# Patient Record
Sex: FEMALE | Race: WHITE | Employment: UNEMPLOYED | URBAN - METROPOLITAN AREA
[De-identification: names, ages, dates, MRNs, and addresses within clinical notes are randomized per-mention and may not be internally consistent; named-entity substitution may affect disease eponyms.]

---

## 2018-08-22 ENCOUNTER — OFFICE VISIT (OUTPATIENT)
Dept: PULMONOLOGY | Age: 63
End: 2018-08-22

## 2018-08-22 VITALS
WEIGHT: 151 LBS | SYSTOLIC BLOOD PRESSURE: 118 MMHG | OXYGEN SATURATION: 99 % | TEMPERATURE: 98.5 F | DIASTOLIC BLOOD PRESSURE: 68 MMHG | BODY MASS INDEX: 27.79 KG/M2 | HEART RATE: 72 BPM | RESPIRATION RATE: 18 BRPM | HEIGHT: 62 IN

## 2018-08-22 DIAGNOSIS — J47.9 BRONCHIECTASIS WITHOUT COMPLICATION (HCC): ICD-10-CM

## 2018-08-22 DIAGNOSIS — R94.2 DIFFUSION CAPACITY OF LUNG (DL), DECREASED: ICD-10-CM

## 2018-08-22 DIAGNOSIS — M32.19 OTHER SYSTEMIC LUPUS ERYTHEMATOSUS WITH OTHER ORGAN INVOLVEMENT (HCC): ICD-10-CM

## 2018-08-22 DIAGNOSIS — D83.9 CVID (COMMON VARIABLE IMMUNODEFICIENCY) (HCC): ICD-10-CM

## 2018-08-22 DIAGNOSIS — J45.990 EXERTIONAL ASTHMA: ICD-10-CM

## 2018-08-22 DIAGNOSIS — K21.9 GASTROESOPHAGEAL REFLUX DISEASE, ESOPHAGITIS PRESENCE NOT SPECIFIED: ICD-10-CM

## 2018-08-22 DIAGNOSIS — R91.1 PULMONARY NODULE: ICD-10-CM

## 2018-08-22 DIAGNOSIS — R05.3 CHRONIC COUGH: ICD-10-CM

## 2018-08-22 DIAGNOSIS — J32.0 CHRONIC MAXILLARY SINUSITIS: ICD-10-CM

## 2018-08-22 DIAGNOSIS — D80.1 HYPOGAMMAGLOBULINEMIA (HCC): ICD-10-CM

## 2018-08-22 DIAGNOSIS — R06.02 SHORTNESS OF BREATH: Primary | ICD-10-CM

## 2018-08-22 DIAGNOSIS — J45.30 MILD PERSISTENT ASTHMA WITHOUT COMPLICATION: ICD-10-CM

## 2018-08-22 DIAGNOSIS — J98.11 ATELECTASIS: ICD-10-CM

## 2018-08-22 DIAGNOSIS — R06.09 DYSPNEA ON EXERTION: ICD-10-CM

## 2018-08-22 RX ORDER — SERTRALINE HYDROCHLORIDE 100 MG/1
200 TABLET, FILM COATED ORAL DAILY
COMMUNITY

## 2018-08-22 RX ORDER — CYCLOBENZAPRINE HCL 10 MG
10 TABLET ORAL 2 TIMES DAILY
COMMUNITY

## 2018-08-22 RX ORDER — FLUTICASONE FUROATE AND VILANTEROL 100; 25 UG/1; UG/1
1 POWDER RESPIRATORY (INHALATION) DAILY
Qty: 1 INHALER | Refills: 0 | Status: SHIPPED | COMMUNITY
Start: 2018-08-22 | End: 2019-05-03 | Stop reason: SDUPTHER

## 2018-08-22 RX ORDER — METFORMIN HYDROCHLORIDE 500 MG/1
1 TABLET, FILM COATED, EXTENDED RELEASE ORAL 2 TIMES DAILY
COMMUNITY

## 2018-08-22 RX ORDER — MELOXICAM 7.5 MG/1
7.5 TABLET ORAL DAILY
COMMUNITY

## 2018-08-22 RX ORDER — FLUTICASONE FUROATE AND VILANTEROL 100; 25 UG/1; UG/1
1 POWDER RESPIRATORY (INHALATION) DAILY
Qty: 1 INHALER | Refills: 11 | Status: SHIPPED | OUTPATIENT
Start: 2018-08-22 | End: 2019-05-03

## 2018-08-22 RX ORDER — CLOPIDOGREL BISULFATE 75 MG/1
75 TABLET ORAL DAILY
COMMUNITY

## 2018-08-22 RX ORDER — SERTRALINE HYDROCHLORIDE 50 MG/1
50 TABLET, FILM COATED ORAL DAILY
COMMUNITY
Start: 2019-10-10 | End: 2019-10-10

## 2018-08-22 RX ORDER — LANSOPRAZOLE 30 MG/1
30 CAPSULE, DELAYED RELEASE ORAL
COMMUNITY

## 2018-08-22 NOTE — PROGRESS NOTES
[address] [facility] [place] [facility]  Pulmonary, Critical Care, and Sleep Medicine    Pulmonary Office Initial Consultation  Name: Lindsay Dean 60 y.o. female  MRN: [id number]  : 1955  Service Date: 18    Referring Provider: No referring provider defined for this encounter. Chief Complaint:   Chief Complaint   Patient presents with    Bronchiectasis     referred by Dr. May Guest of Breath       History of Present Illness:  Lindsay Dean is a 60 y.o. female, who presents to Pulmonary clinic referred for management of bronchiectasis and shortness of breath. Pt has been following with Dr. Charlotte Mcintyre and wants to find a provider closer to [place]. Pt moved to [place] from [facility] about 3 years ago. 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 She was following with ID and was then referred to Pulmonary. Pt reports she gets 40mg every 2 weeks IVIG infusions -- she gives this to herself at home. Pt reports issues with SOB on exertion. She describes this as \"i feel like I try to take in a deep breath and I'm not opening\". Pt reports walking about 0.6 miles on a treadmill and that's when she feels this symptoms. Per reports, this has been going on since the end of last year. Associated with dizziness, chest tightness  Alleviated by rest, self-limited, no other aggravating or precipitating factors  No seasonal variation  Pt uses PRN albuterol morning and night as well as before walking on the treadmill. Pt reports some improvement to dyspnea. Pt reports she was placed on Singulair in April by ENT and noted that she felt a bit better. Pt able to get around grocery store and do ADLs. Pt has chronic cough, recently worsened, but was alleviated by stopping lipitor.   Cough occurs intermittently, nonproductive, no other precipitating, alleviating, or aggravating factors    Pt has attempted hypertonic saline about 3-4 years ago, stopped after one month. Also has a flutter device but does not use it.  + GERD -- pt on lansoprazole - taking BID -- still has persistent sx. Last EGD was 5 years ago -- she had multiple polyps. Pt denies any ER visits or hospitalizations for breathing issues  Pt reports episodes of recurrent bronchitis about 20-30 years ago. Pt reports that since restarting IVIG, she has been doing well and not had any episodes since then  Pt reports recurrent episodes of sinusits -- she reports she saw multiple ENT physicians (now follows with Jaxon Rubio with [facility]) and had surgery. She had chronic colonization with MRSA requiring. No chest pain. Pt had a cardiac workup - follows with Dr. Cachorro Small, who did a stress echo and cleared the patient  With regards to SLE -- pt was plaquenil. She had to this about 3 years ago due to vision problems with the medicatoin. Pt has been on imuran in the past, but she did not tolerate it. Pt denies N/V/D, chest pain/angina, night sweats, hemoptysis, sputum, abdominal pain, LE swelling. 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  Only secondhand smoke as a child      Past Medical Hx: I have personally reviewed medical hx  Past Medical History:   Diagnosis Date    Anxiety     Aortic insufficiency     CVA [facility]cerebral vascular accident) ([facility] [address]     3 strokes and multiple transient ischemic attacks    Depression     anxiety    DOS (diffuse esophageal spasm)     Esophageal reflux     Fibromyalgia     Ganglion     Heart disease     Heart murmur     Hx MRSA infection     Hypercholesterolemia     Hypogammaglobulinemia (HCC)     IBS (irritable bowel syndrome)     MS (multiple sclerosis) (HCC)     Nausea and vomiting     SLE (systemic lupus erythematosus) ([facility] [address] [facility]     Staphylococcus infection of nose 2009    Thyroid disease        Past Surgical Hx: I have personally reviewed surgical hx  Past Surgical History:   Procedure Laterality Date    HX  SECTION      HX CHOLECYSTECTOMY      HX HYSTERECTOMY      HX RHINOPLASTY      HX TONSILLECTOMY      HX TUBAL LIGATION      HX VASCULAR ACCESS Right 2011       Family Hx: I have personally reviewed the family hx. Family History   Problem Relation Age of Onset    Heart Disease Mother     Diabetes Mother     Diabetes Father     Hypertension Father     Diabetes Maternal Aunt     Heart Disease Maternal Grandmother     Diabetes Maternal Grandmother     Stroke Maternal Grandmother     Diabetes Maternal Grandfather     Heart Disease Maternal Grandfather     Stroke Maternal Grandfather        Social Hx: I have personally reviewed the social hx. Social History     Social History    Marital status:      Spouse name: N/A    Number of children: N/A    Years of education: N/A     Occupational History    Not on file. Social History Main Topics    Smoking status: Never Smoker    Smokeless tobacco: Never Used    Alcohol use No    Drug use: No    Sexual activity: Not on file     Other Topics Concern    Not on file     Social History Narrative     Occupational Hx: No occupational exposure to coal, silica, or asbestos      Allergies: I have reviewed the allergy hx  Allergies   Allergen Reactions    Ceftin [Cefuroxime Axetil] Anaphylaxis and Angioedema    Codeine Hives    Dilaudid [Hydromorphone] Anaphylaxis and Angioedema    Hydrocodone Hives    Meperidine Hives    Oxycodone Hives    Sulfa (Sulfonamide Antibiotics) Hives    Adhesive Other (comments)    Cephalosporins Rash and Itching    Linezolid Nausea and Vomiting       Medications:  I have reviewed the patient's medications  Prior to Admission medications    Medication Sig Start Date End Date Taking? Authorizing Provider   meloxicam (MOBIC) 7.5 mg tablet Take  by mouth daily.    Yes Historical Provider cyclobenzaprine (FLEXERIL) 10 mg tablet Take 10 mg by mouth two (2) times a day. Yes Historical Provider   sertraline (ZOLOFT) 100 mg tablet Take 100 mg by mouth daily. Yes Historical Provider   sertraline (ZOLOFT) 50 mg tablet Take 50 mg by mouth daily. Yes Historical Provider   clopidogrel (PLAVIX) 75 mg tab Take  by mouth. Yes Historical Provider   metFORMIN (GLUMETZA ER) 500 mg TG24 24 hour tablet Take  by mouth two (2) times a day. Yes Historical Provider   lansoprazole (PREVACID) 30 mg capsule Take 30 mg by mouth two (2) times a day. Yes Historical Provider   diazepam (VALIUM) 2 mg tablet Take 1 Tab by mouth every six (6) hours as needed for Sleep. Max Daily Amount: 8 mg. 4/2/16  Yes Orly Fitzgerald MD   traMADol (ULTRAM) 50 mg tablet Take 2 Tabs by mouth every six (6) hours as needed. Max Daily Amount: 400 mg. 4/2/16  Yes Orly Fitzgerald MD   ergocalciferol (ERGOCALCIFEROL) 50,000 unit capsule Take 50,000 Units by mouth every seven (7) days. Indications: PREVENTION OF VITAMIN D DEFICIENCY   Yes Historical Provider   promethazine (PHENERGAN) 25 mg tablet Take 25 mg by mouth every six (6) hours as needed for Nausea. Indications: nausea   Yes Historical Provider   albuterol (PROVENTIL HFA, VENTOLIN HFA, PROAIR HFA) 90 mcg/actuation inhaler Take  by inhalation every four (4) hours as needed for Wheezing or Shortness of Breath. Yes Historical Provider   co-enzyme Q-10 (CO Q-10) 100 mg capsule Take 100 mg by mouth daily. Yes Historical Provider   immune globulin 10% (GAMMAGARD) 10 % infusion 30 g by IntraVENous route two (2) times a week. Indications: [address]   Yes Historical Provider   guaiFENesin (MUCINEX) 1,200 mg Ta12 ER tablet Take 1,200 mg by mouth as needed for Congestion. Yes Historical Provider   diazepam (VALIUM) 2 mg tablet Take 2 mg by mouth every six (6) hours as needed for Sleep. Yes Historical Provider   Biotin 2,500 mcg cap Take 1,000 mg by mouth daily.    Yes Historical Provider   acetaminophen (TYLENOL) 500 mg tablet Take 1,000 mg by mouth every six (6) hours as needed for Pain. Yes Historical Provider   vancomycin 1,000 mg 1,000 mg IVPB 1 g by IntraNASal route as needed (mixed with Amphotericin  for nasal wash). Yes Historical Provider   traMADol (ULTRAM) 50 mg tablet Take 50 mg by mouth every six (6) hours as needed. Yes Historical Provider   propranolol LA (INDERAL LA) 120 mg SR capsule Take 120 mg by mouth daily. Yes Historical Provider   multivitamin (ONE A DAY) tablet Take 1 Tab by mouth daily. Yes Historical Provider   gabapentin (NEURONTIN) 300 mg capsule Take 300 mg by mouth three (3) times daily. Yes Historical Provider   modafinil (PROVIGIL) 200 mg tablet Take 200 mg by mouth daily. Yes Historical Provider   aspirin 81 mg tablet Take 81 mg by mouth. Yes Historical Provider   simvastatin (ZOCOR) 40 mg tablet Take 20 mg by mouth nightly. Historical Provider   vitamin c-vitamin e cap Take 1 Tab by mouth as needed. Historical Provider   DULoxetine (CYMBALTA) 30 mg capsule Take 30 mg by mouth daily. Historical Provider   zolpidem (AMBIEN) 10 mg tablet Take  by mouth nightly as needed. Historical Provider   celecoxib (CELEBREX) 200 mg capsule Take  by mouth two (2) times a day. Historical Provider       Immunizations:  I have reviewed the patient's immunizations    There is no immunization history on file for this patient. Review of Systems:  A complete review of systems was performed as stated in the HPI, all others are negative.       Objective:    Physical Exam:  /68 (BP 1 Location: Left arm, BP Patient Position: Sitting)  Pulse 72  Temp 98.5 °F (36.9 °C) (Oral)   Resp 18  Ht 5' 2\" (1.575 m)  Wt 68.5 kg (151 lb)  SpO2 99%  BMI 27.62 kg/m2  Vitals were personally reviewed  Gen: no acute distress, pleasant and cooperative, sitting up in chair, able to climb to exam table w/o difficulty  HEENT: normocephalic/atraumatic, PERRLA, EOMI, no scleral icterus, nasal turbinates have no erythema, no nasal polyps, no oral lesions, good dentition, Mallampati III  Neck: supple, trachea midline, no JVD, no cervical and supraclavicular adenopathy  Chest: no lesions, with even rise and fall, no pectus excavatum or flail chest  CVS: regular rate rhythm, S1/S2, no murmurs/rubs/gallops  Lungs: good air entry B/L, no wheezes/rales/rhonchi  Back: no kyphosis or scoliosis  Abdomen: soft, nontender, bowel sounds present, no hepatosplenomegaly  Ext: no pitting edema B/L, no peripheral cyanosis or clubbing  Neuro: grossly normal, AAOx3, normal strength and coordination grossly, no focal deficits  Skin: no rashes, erythema, lesions  Psych: normal memory, thought content, and processing    Labs: I have reviewed the patient's available labs  Lab Results   Component Value Date/Time    WBC 7.6 03/25/2016 12:54 PM    HGB 13.7 03/25/2016 12:54 PM    HCT 42.9 03/25/2016 12:54 PM    PLATELET 277 03/84/4626 12:54 PM    MCV 89.6 03/25/2016 12:54 PM     Lab Results   Component Value Date/Time    Sodium 136 03/25/2016 12:54 PM    Potassium 4.3 03/25/2016 12:54 PM    Chloride 102 03/25/2016 12:54 PM    CO2 29 03/25/2016 12:54 PM    Glucose 103 03/25/2016 12:54 PM    BUN 13 03/25/2016 12:54 PM    Creatinine 1.0 03/25/2016 12:54 PM    GFR est AA >60.0 03/25/2016 12:54 PM    GFR est non-AA 60 03/25/2016 12:54 PM    Calcium 9.0 03/25/2016 12:54 PM    Bilirubin, total 0.4 03/25/2016 12:54 PM    AST (SGOT) 48 (H) 03/25/2016 12:54 PM    Alk. phosphatase 151 (H) 03/25/2016 12:54 PM    Protein, total 8.3 (H) 03/25/2016 12:54 PM    Albumin 3.3 (L) 03/25/2016 12:54 PM    ALT (SGPT) 45 03/25/2016 12:54 PM       Outside records reviewed as follows -- Records from her Pulmonologist - Dr. Rachelle Schumacher with [facility] -- seen on 9/13/16 - noted to have chronic cough - attempted to try Spiriva. Noted that pt had GERD but could not afford Dexilant.   Pt on IVIG for CVID.  Given Tessalon pearls. Pt seen on 4/19/18 by the VARINDER Gavin -- pt reported SOB with exertion on treadmill -- they did doppler to rule out VTE  PFTs from that day show normal ratio - normal FEV1, normal FVC, normal TLC, and decreased DLCO to 56% uncorrected  Pt then saw Dr. Charlotte Mcintyre on 5/22/18 -- pt had continued TAYLOR and cough. He wanted to rule out pHTN with TTE. Pt emotional during this visit per records. D-dimer was negative  Pt had 6MWT, which showed SpO2 went from 98% to 93%, no SpO2 <88%    Imaging:  I have personally reviewed patient's imaging -- no images available. Only CT chest report from 6/18/12 which shows mild diffuse bronchiectasis, dependent atelctasis B/L, some mild bilateral bronchiectasis, no GGO. Shows an RUL 4mm nodule. PFTs:  I have reviewed the patient's PFTs from 4/5/18 done at prior pulmonogist's office -- edwin and lung volumes are normal, no BD response. Diffusion capacity is moderately reduced to 56%    TTE:  I have reviewed the patient's TTE results  No results found for this or any previous visit. Assessment and Plan:  60 y.o. female with:    Impression:  1. Shortness of breath/dyspnea on exertion:  Etiology unclear but appears to be multifactorial.  Pt has underlying bronchiectasis from CVID, which appears to be well controlled with IVIG supplementation. Workup for these symptoms by her prior provider included ruling out VTE and pHTN, which has been unremarkable. Although her symptoms are not classic, she appears to have sx of an exercise induced asthma given some improvement with singulair and PRN albuterol. There also appears to be a component of mild deconditioning as well. Cardiac workup is negative  2. Non-CF bronchiectasis:  Secondary to CVID. 3. CVID with hypogammaglobulinemia:    Pt on IVIG supplementation  4. Hx of SLE:  Pt not on DMARD therapy, was on plaquenil prior  5. Atelectasis:  Seen on prior CT  6. Lung nodule:  Seen in RUL, 4mm  7. Chronic cough - suspect some component of exertional asthma vs mild deconditioning  8. Reduced diffusion capacity:  Moderate on last PFTs - unclear etiology, no significant desat with 6MWT  9. GERD:  Poorly controlled on BID PPI    Plan:  -Obtain CT chest high res protocol  -Spent time discussing patient's respiratory issues as pt has had a long complicated course. We went over her bronchiectasis, etiology, pathophysiology, and treatments which include treating underlying cause (CVID - pt on IVIG) and airway clearance. Since patient has not had any episodes of bronchitis, we will hold off on airway clearance, but reported to patient that she may develop bronchitis in the future and she will need to start airway clearance at that time with hypertonic saline/alb nebs and flutter device/acapella.  -Pt agreeable to empiric treatment of asthma -- start Breo 100/25mcg 1 puff once daily. Counseled pt to rinse mouth thoroughly after each use. Sample given in clinic  -Continue Albuterol HFA 1-2puffs q4-6h PRN. Counseled patient that this is their rescue inhaler. Also counseled patient regarding premedication 15-30m prior to exercise or exposure to very cold air  -Counseled patient on proper inhaler technique  -Counseled patient to avoid potential triggers of asthma.  -Continue Singulair 10mg daily  -Immunizations reviewed   There is no immunization history on file for this patient.  -Counseled on reflux lifestyle precautions and to continue BID PPI - take 30min before meals  -PFTs at next visit  -Refer patient to GI for further workup and management of GERD -- pH Bravo testing  -Continue IVIG infusions q2 weeks by Dr. Nick Cristina  -Management of SLE per Dr. Marissa Hensley      RTC: Follow-up Disposition:  Return in about 4 months (around 12/22/2018).       Orders Placed This Encounter    AMB POC PFT COMPLETE W/BRONCHODILATOR    AMB POC PFT COMPLETE W/O BRONCHODILATOR    GAS DILUT/WASHOUT LUNG VOL W/WO DISTRIB VENT&VOL    DIFFUSING CAPACITY    CT CHEST W WO CONT    REFERRAL TO GASTROENTEROLOGY    fluticasone-vilanterol (BREO ELLIPTA) 100-25 mcg/dose inhaler    fluticasone-vilanterol (BREO ELLIPTA) 100-25 mcg/dose inhaler         James Gross MD/MPH     Pulmonary, Critical Care Medicine  [facility]

## 2018-08-22 NOTE — LETTER
8/22/2018 12:53 PM 
 
Patient:  Tutu Savage YOB: 1955 Date of Visit: 8/22/2018 Dear Alba Vincent MD 
[address] [facility] [address] VIA Facsimile: [phone] Nicho Vera MD 
[address] [place] [address] [facility] [address] VIA Facsimile: [phone] Sun Guardado MD 
[address] [place] [address] [facility] [address] VIA Facsimile: [phone] Raad Bergeron MD 
[facility] [address] 
[address] [facility] [address] VIA Facsimile: [phone] Mario Garrido MD 
[address] [facility] [facility] [address] VIA In Basket 
 : Thank you for referring Ms. Tutu Savage to me for evaluation/treatment. Below are the relevant portions of my assessment and plan of care. If you have questions, please do not hesitate to call me. I look forward to following Ms. Colt Robb along with you. Sincerely, Shahrzad Forrester MD/MPH Pulmonary, Critical Care Medicine [facility]

## 2018-08-22 NOTE — PROGRESS NOTES
Chief Complaint   Patient presents with    Bronchiectasis     referred by Dr. Judy Quinonez of Breath     1. Have you been to the ER, urgent care clinic since your last visit? Hospitalized since your last visit? No    2. Have you seen or consulted any other health care providers outside of the [address] [place] [address] since your last visit? Include any pap smears or colon screening.  Yes Where: Dr. Isabelle Moore, PCP, Dr. Norma Cook, [address] [place] [address]

## 2018-08-22 NOTE — MR AVS SNAPSHOT
[address] [facility], [address] [address] 
[phone] Patient: Alicia Pinon MRN: [id number] Visit Information Date & Time Provider Department Dept. Phone Encounter #  
 8/22/2018  9:00 AM Gunner Diaz MD [facility] Laura Myles [id number] Follow-up Instructions Return in about 4 months (around 12/22/2018). Upcoming Health Maintenance Date Due Hepatitis C Screening 1955 DTaP/Tdap/Td series (1 - Tdap) 9/24/1975 BREAST CANCER SCRN MAMMOGRAM 9/24/2004 FOBT Q 1 YEAR AGE 50-75 9/24/2004 ZOSTER VACCINE AGE 60> 7/24/2014 PAP AKA CERVICAL CYTOLOGY 4/18/2015 MEDICARE YEARLY EXAM 3/20/2018 Influenza Age 5 to Adult 8/1/2018 Allergies as of 8/22/2018  Review Complete On: 8/22/2018 By: Carisa Pink LPN Severity Noted Reaction Type Reactions Ceftin [Cefuroxime Axetil] High 03/22/2012    Anaphylaxis, Angioedema Codeine High 03/22/2012    Hives Dilaudid [Hydromorphone] High 03/22/2012    Anaphylaxis, Angioedema Hydrocodone High 03/22/2012    Hives Meperidine High 03/22/2012    Hives Oxycodone High 03/22/2012    Hives Sulfa (Sulfonamide Antibiotics) High 03/22/2012    Hives Adhesive  03/22/2012    Other (comments) Cephalosporins  03/22/2012    Rash, Itching Linezolid  03/22/2012    Nausea and Vomiting Current Immunizations  Never Reviewed No immunizations on file. Not reviewed this visit You Were Diagnosed With   
  
 Codes Comments Shortness of breath    -  Primary ICD-10-CM: R06.02 
ICD-9-CM: 786.05 Dyspnea on exertion     ICD-10-CM: R06.09 
ICD-9-CM: 786.09 Bronchiectasis without complication ([facility]     IGOR: J47.9 ICD-9-CM: 494.0 CVID (common variable immunodeficiency) ([facility].)     ICD-10-CM: D83.9 ICD-9-CM: 279.06 Hypogammaglobulinemia ([address]     ICD-10-CM: D80.1 ICD-9-CM: 279.00 Other systemic lupus erythematosus with other organ involvement ([facility]     ICD-10-CM: M32.19 ICD-9-CM: 710.0 Mild persistent asthma without complication     JIR-96-: J45.30 ICD-9-CM: 493.90 Atelectasis     ICD-10-CM: J98.11 ICD-9-CM: 518.0 Vitals BP Pulse Temp Resp Height(growth percentile) Weight(growth percentile)  
 118/68 (BP 1 Location: Left arm, BP Patient Position: Sitting) 72 98.5 °F (36.9 °C) (Oral) 18 5' 2\" (1.575 m) 151 lb (68.5 kg) SpO2 BMI OB Status Smoking Status 99% 27.62 kg/m2 Hysterectomy Never Smoker Vitals History BMI and BSA Data Body Mass Index Body Surface Area  
 27.62 kg/m 2 1.73 m 2 Preferred Pharmacy Pharmacy Name Phone [id number]- [place], [address] [facility] [phone] Your Updated Medication List  
  
   
This list is accurate as of 8/22/18 10:13 AM.  Always use your most recent med list.  
  
  
  
  
 acetaminophen 500 mg tablet Commonly known as:  TYLENOL Take 1,000 mg by mouth every six (6) hours as needed for Pain. albuterol 90 mcg/actuation inhaler Commonly known as:  PROVENTIL HFA, VENTOLIN HFA, PROAIR HFA Take  by inhalation every four (4) hours as needed for Wheezing or Shortness of Breath. AMBIEN 10 mg tablet Generic drug:  zolpidem Take  by mouth nightly as needed. aspirin 81 mg tablet Take 81 mg by mouth. Biotin 2,500 mcg Cap Take 1,000 mg by mouth daily. CeleBREX 200 mg capsule Generic drug:  celecoxib Take  by mouth two (2) times a day. co-enzyme Q-10 100 mg capsule Commonly known as:  CO Q-10 Take 100 mg by mouth daily. cyclobenzaprine 10 mg tablet Commonly known as:  FLEXERIL Take 10 mg by mouth two (2) times a day. CYMBALTA 30 mg capsule Generic drug:  DULoxetine Take 30 mg by mouth daily. * diazePAM 2 mg tablet Commonly known as:  VALIUM  
 Take 2 mg by mouth every six (6) hours as needed for Sleep. * diazePAM 2 mg tablet Commonly known as:  VALIUM Take 1 Tab by mouth every six (6) hours as needed for Sleep. Max Daily Amount: 8 mg. ergocalciferol 50,000 unit capsule Commonly known as:  ERGOCALCIFEROL Take 50,000 Units by mouth every seven (7) days. Indications: PREVENTION OF VITAMIN D DEFICIENCY * fluticasone-vilanterol 100-25 mcg/dose inhaler Commonly known as:  BREO ELLIPTA Take 1 Puff by inhalation daily. * fluticasone-vilanterol 100-25 mcg/dose inhaler Commonly known as:  BREO ELLIPTA Take 1 Puff by inhalation daily. guaiFENesin 1,200 mg Ta12 ER tablet Commonly known as:  PrestaShop Take 1,200 mg by mouth as needed for Congestion. immune globulin 10% 10 % infusion Commonly known as:  GAMMAGARD  
30 g by IntraVENous route two (2) times a week. Indications: [address] INDERAL  mg SR capsule Generic drug:  propranolol LA Take 120 mg by mouth daily. lansoprazole 30 mg capsule Commonly known as:  PREVACID Take 30 mg by mouth two (2) times a day. meloxicam 7.5 mg tablet Commonly known as:  MOBIC Take  by mouth daily. metFORMIN 500 mg Tg24 24 hour tablet Commonly known asSusana Power ER Take  by mouth two (2) times a day. multivitamin tablet Commonly known as:  ONE A DAY Take 1 Tab by mouth daily. NEURONTIN 300 mg capsule Generic drug:  gabapentin Take 300 mg by mouth three (3) times daily. PLAVIX 75 mg Tab Generic drug:  clopidogrel Take  by mouth.  
  
 promethazine 25 mg tablet Commonly known as:  PHENERGAN Take 25 mg by mouth every six (6) hours as needed for Nausea. Indications: nausea PROVIGIL 200 mg tablet Generic drug:  modafinil Take 200 mg by mouth daily. simvastatin 40 mg tablet Commonly known as:  ZOCOR Take 20 mg by mouth nightly. * traMADol 50 mg tablet Commonly known as:  Awanda Dilip  
 Take 2 Tabs by mouth every six (6) hours as needed. Max Daily Amount: 400 mg.  
  
 * ULTRAM 50 mg tablet Generic drug:  traMADol Take 50 mg by mouth every six (6) hours as needed. vancomycin 1,000 mg 1,000 mg IVPB  
1 g by IntraNASal route as needed (mixed with Amphotericin  for nasal wash). vitamin c-vitamin e Cap Take 1 Tab by mouth as needed. * ZOLOFT 100 mg tablet Generic drug:  sertraline Take 100 mg by mouth daily. * ZOLOFT 50 mg tablet Generic drug:  sertraline Take 50 mg by mouth daily. * Notice: This list has 8 medication(s) that are the same as other medications prescribed for you. Read the directions carefully, and ask your doctor or other care provider to review them with you. Prescriptions Sent to Pharmacy Refills  
 fluticasone-vilanterol (BREO ELLIPTA) 100-25 mcg/dose inhaler 11 Sig: Take 1 Puff by inhalation daily. Class: Normal  
 Pharmacy: [address] #: [phone] Route: Inhalation We Performed the Following REFERRAL TO GASTROENTEROLOGY [PAE43 Custom] Comments:  
 Please evaluate patient for chronic GERD - consider pH impedance testing. Follow-up Instructions Return in about 4 months (around 12/22/2018). To-Do List   
 08/22/2018 Imaging:  CT CHEST W WO CONT   
  
 08/23/2018 Procedures: AMB POC PFT COMPLETE W/BRONCHODILATOR   
  
 08/23/2018 Procedures: AMB POC PFT COMPLETE W/O BRONCHODILATOR   
  
 08/23/2018 Procedures:  DIFFUSING CAPACITY   
  
 08/23/2018 Procedures:  GAS DILUT/WASHOUT LUNG VOL W/WO DISTRIB VENT&VOL Referral Information Referral ID Referred By [facility]  
  
 [id number]   
   [address] [place] [address] Drive [address] [place], [address] Phone: [phone] Fax: [phone] Visits Status Start Date End Date 1 New Request 8/22/18 8/22/19 If your referral has a status of pending review or denied, additional information will be sent to support the outcome of this decision. Introducing [facility]! [facility] introduces Opternative patient portal. Now you can access parts of your medical record, email your doctor's office, and request medication refills online. 1. In your internet browser, go to [email or web address] 2. Click on the First Time User? Click Here link in the Sign In box. You will see the New Member Sign Up page. 3. Enter your Opternative Access Code exactly as it appears below. You will not need to use this code after youve completed the sign-up process. If you do not sign up before the expiration date, you must request a new code. · Opternative Access Code: [email or web address] Expires: 11/20/2018  8:55 AM 
 
4. Enter the last four digits of your Social Security Number (xxxx) and Date of Birth (mm/dd/yyyy) as indicated and click Submit. You will be taken to the next sign-up page. 5. Create a Opternative ID. This will be your Opternative login ID and cannot be changed, so think of one that is secure and easy to remember. 6. Create a Opternative password. You can change your password at any time. 7. Enter your Password Reset Question and Answer. This can be used at a later time if you forget your password. 8. Enter your e-mail address. You will receive e-mail notification when new information is available in [address] 9. Click Sign Up. You can now view and download portions of your medical record. 10. Click the Download Summary menu link to download a portable copy of your medical information. If you have questions, please visit the Frequently Asked Questions section of the Opternative website. Remember, Opternative is NOT to be used for urgent needs. For medical emergencies, dial 911. Now available from your iPhone and Android! Please provide this summary of care documentation to your next provider. Your primary care clinician is listed as Deana Chilel. If you have any questions after today's visit, please call [phone].

## 2018-09-21 ENCOUNTER — HOSPITAL ENCOUNTER (OUTPATIENT)
Dept: CT IMAGING | Age: 63
Discharge: HOME | End: 2018-09-21
Attending: INTERNAL MEDICINE
Payer: MEDICARE

## 2018-09-21 DIAGNOSIS — R06.09 DYSPNEA ON EXERTION: ICD-10-CM

## 2018-09-21 DIAGNOSIS — R06.02 SHORTNESS OF BREATH: ICD-10-CM

## 2018-09-21 DIAGNOSIS — R05.3 CHRONIC COUGH: ICD-10-CM

## 2018-09-21 DIAGNOSIS — D80.1 HYPOGAMMAGLOBULINEMIA (HCC): ICD-10-CM

## 2018-09-21 DIAGNOSIS — J47.9 BRONCHIECTASIS WITHOUT COMPLICATION (HCC): ICD-10-CM

## 2018-09-21 DIAGNOSIS — K21.9 GASTROESOPHAGEAL REFLUX DISEASE, ESOPHAGITIS PRESENCE NOT SPECIFIED: ICD-10-CM

## 2018-09-21 DIAGNOSIS — J45.30 MILD PERSISTENT ASTHMA WITHOUT COMPLICATION: ICD-10-CM

## 2018-09-21 DIAGNOSIS — J98.11 ATELECTASIS: ICD-10-CM

## 2018-09-21 DIAGNOSIS — R94.2 DIFFUSION CAPACITY OF LUNG (DL), DECREASED: ICD-10-CM

## 2018-09-21 DIAGNOSIS — R91.1 PULMONARY NODULE: ICD-10-CM

## 2018-09-21 DIAGNOSIS — J45.990 EXERTIONAL ASTHMA: ICD-10-CM

## 2018-09-21 DIAGNOSIS — M32.19 OTHER SYSTEMIC LUPUS ERYTHEMATOSUS WITH OTHER ORGAN INVOLVEMENT (HCC): ICD-10-CM

## 2018-09-21 DIAGNOSIS — D83.9 CVID (COMMON VARIABLE IMMUNODEFICIENCY) (HCC): ICD-10-CM

## 2018-09-21 LAB — CREAT UR-MCNC: 1 MG/DL (ref 0.6–1.3)

## 2018-09-21 PROCEDURE — 74011636320 HC RX REV CODE- 636/320: Performed by: INTERNAL MEDICINE

## 2018-09-21 PROCEDURE — 71260 CT THORAX DX C+: CPT

## 2018-09-21 PROCEDURE — 82565 ASSAY OF CREATININE: CPT

## 2018-09-21 RX ADMIN — IOPAMIDOL 63 ML: 612 INJECTION, SOLUTION INTRAVENOUS at 10:00

## 2019-05-03 ENCOUNTER — OFFICE VISIT (OUTPATIENT)
Dept: PULMONOLOGY | Age: 64
End: 2019-05-03

## 2019-05-03 VITALS
TEMPERATURE: 98.3 F | DIASTOLIC BLOOD PRESSURE: 80 MMHG | SYSTOLIC BLOOD PRESSURE: 140 MMHG | WEIGHT: 143 LBS | HEART RATE: 83 BPM | HEIGHT: 62 IN | BODY MASS INDEX: 26.31 KG/M2 | OXYGEN SATURATION: 99 % | RESPIRATION RATE: 20 BRPM

## 2019-05-03 DIAGNOSIS — R05.3 CHRONIC COUGH: ICD-10-CM

## 2019-05-03 DIAGNOSIS — J45.30 MILD PERSISTENT ASTHMA WITHOUT COMPLICATION: ICD-10-CM

## 2019-05-03 DIAGNOSIS — R06.09 DYSPNEA ON EXERTION: ICD-10-CM

## 2019-05-03 DIAGNOSIS — D80.1 HYPOGAMMAGLOBULINEMIA (HCC): ICD-10-CM

## 2019-05-03 DIAGNOSIS — D83.9 CVID (COMMON VARIABLE IMMUNODEFICIENCY) (HCC): ICD-10-CM

## 2019-05-03 DIAGNOSIS — M32.19 OTHER SYSTEMIC LUPUS ERYTHEMATOSUS WITH OTHER ORGAN INVOLVEMENT (HCC): ICD-10-CM

## 2019-05-03 DIAGNOSIS — R94.2 DECREASED DIFFUSION CAPACITY: Primary | ICD-10-CM

## 2019-05-03 DIAGNOSIS — R06.02 SHORTNESS OF BREATH: ICD-10-CM

## 2019-05-03 DIAGNOSIS — K21.9 LARYNGOPHARYNGEAL REFLUX (LPR): ICD-10-CM

## 2019-05-03 RX ORDER — FLUTICASONE FUROATE AND VILANTEROL 200; 25 UG/1; UG/1
1 POWDER RESPIRATORY (INHALATION) DAILY
Qty: 1 INHALER | Refills: 11 | Status: SHIPPED | OUTPATIENT
Start: 2019-05-03 | End: 2020-10-06 | Stop reason: SDUPTHER

## 2019-05-03 RX ORDER — ATORVASTATIN CALCIUM 10 MG/1
TABLET, FILM COATED ORAL
COMMUNITY
Start: 2019-10-10 | End: 2019-10-10

## 2019-05-03 RX ORDER — FLUCONAZOLE 150 MG/1
150 TABLET ORAL
COMMUNITY

## 2019-05-03 RX ORDER — LEVOTHYROXINE SODIUM 50 UG/1
50 TABLET ORAL
COMMUNITY
Start: 2018-12-21

## 2019-05-03 RX ORDER — MEROPENEM 1 G/1
1 INJECTION, POWDER, FOR SOLUTION INTRAVENOUS EVERY 8 HOURS
COMMUNITY
Start: 2019-04-29 | End: 2019-10-10

## 2019-05-03 RX ORDER — MONTELUKAST SODIUM 10 MG/1
1 TABLET ORAL DAILY
COMMUNITY

## 2019-05-03 RX ORDER — RIZATRIPTAN BENZOATE 10 MG/1
10 TABLET ORAL
COMMUNITY
Start: 2019-04-17

## 2019-05-03 RX ORDER — FLUTICASONE FUROATE AND VILANTEROL 200; 25 UG/1; UG/1
1 POWDER RESPIRATORY (INHALATION) DAILY
Qty: 1 INHALER | Refills: 0 | Status: SHIPPED | COMMUNITY
Start: 2019-05-03 | End: 2019-10-10 | Stop reason: SDUPTHER

## 2019-05-03 RX ORDER — DIMETHICONE 13 MG/ML
425 LOTION TOPICAL DAILY
COMMUNITY

## 2019-05-03 NOTE — PROGRESS NOTES
Prosper Holter presents today for   Chief Complaint   Patient presents with    Shortness of Breath     follow up from 8/22/2018; CT 11/15/2018    Bronchiectasis    Asthma    Lung Nodule    Cough    COPD       Is someone accompanying this pt? No    Is the patient using any DME equipment during OV? Yes. [address] N/A    Depression Screening:  3 most recent PHQ Screens 5/3/2019   Little interest or pleasure in doing things Not at all   Feeling down, depressed, irritable, or hopeless Not at all   Total Score PHQ 2 0       Learning Assessment:  Learning Assessment 5/3/2019   PRIMARY LEARNER Patient   PRIMARY LANGUAGE ENGLISH   LEARNER PREFERENCE PRIMARY DEMONSTRATION     LISTENING     PICTURES     READING     VIDEOS   ANSWERED BY Patient   RELATIONSHIP SELF       Abuse Screening:  No flowsheet data found. Fall Risk  No flowsheet data found. Coordination of Care:  1. Have you been to the ER, urgent care clinic since your last visit? Hospitalized since your last visit? Yes; Where: [facility] ED, When: 11/14-11/15/2018-[place]    2. Have you seen or consulted any other health care providers outside of the [address] [place] [address] since your last visit? Include any pap smears or colon screening. Yes. Dr. Isabelle Moore, PCP    Medication variance in dosage/sig per patient as follows: None.

## 2019-05-03 NOTE — PROGRESS NOTES
[address] [facility], [address] [facility]  Pulmonary, Critical Care, and Sleep Medicine    Pulmonary Office F/U  Name: Nguyen Sweeney 58 y.o. female  MRN: [id number]  : 1955  Service Date: 19  Chief Complaint:   Chief Complaint   Patient presents with    Shortness of Breath     follow up from 2018; CT 11/15/2018    Bronchiectasis    Asthma    Lung Nodule    Cough    COPD       History of Present Illness:  Nguyen Sweeney is a 58 y.o. female, who presents to Pulmonary clinic for followup of bronchiectasis and shortness of breath. Patient was last seen on 2018. In the interval, patient reports she is on Meropenem (follows with [facility]) for a pseudomonal sinus infection -- 6 weeks TID. Pt reports she had the infection back in November. Pt reports she had her IVIG increased during this time. Pt had cx done by her ENT - Dr. Raymundo Nagy  Pt continues to have SOB, no change to her symptoms at all. Patient has been compliant with Breo 100/25 MCG 1 puff daily. Pt reports using PRN albuterol 5-6x per month. Patient does report that the PRN albuterol helps with her dyspnea at the time. No other modifying factors. Patient reports dyspnea with exertion mainly, still occurs with moderate exertion. Patient also reports increased fatigue since being on chronic antibiotic therapy. Pt reports chronic cough at night. Generally nonproductive. Pt followed up with GI (Dr. Geoff Hunt) -patient declined pH impedance testing. She continues PPI. Pt denies N/V/D, chest pain/angina, night sweats, hemoptysis, sputum, abdominal pain, LE swelling.       Past Medical History:   Diagnosis Date    Anxiety     Aortic insufficiency     Chronic lung disease     CVA (cerebral vascular accident) ([facility] [address]     3 strokes and multiple transient ischemic attacks    Depression     anxiety    DOS (diffuse esophageal spasm)     Esophageal reflux     Fibromyalgia     Ganglion     Heart disease     Heart murmur     Hx MRSA infection     Hypercholesterolemia     Hypogammaglobulinemia (HCC)     IBS (irritable bowel syndrome)     MS (multiple sclerosis) (HCC)     Nausea and vomiting     SLE ([facility]ic lupus erythematosus) [facility] [address]     Staphylococcus infection of nose 2009    Thyroid disease      Past Surgical History:   Procedure Laterality Date    HX  SECTION      HX CHOLECYSTECTOMY      HX HYSTERECTOMY      HX RHINOPLASTY      HX TONSILLECTOMY      HX TUBAL LIGATION      HX VASCULAR ACCESS Right 2011     Family History   Problem Relation Age of Onset    Heart Disease Mother     Diabetes Mother     Diabetes Father     Hypertension Father     Diabetes Maternal Aunt     Heart Disease Maternal Grandmother     Diabetes Maternal Grandmother     Stroke Maternal Grandmother     Diabetes Maternal Grandfather     Heart Disease Maternal Grandfather     Stroke Maternal Grandfather      Social History     Socioeconomic History    Marital status:      Spouse name: Not on file    Number of children: Not on file    Years of education: Not on file    Highest education level: Not on file   Occupational History    Not on file   Social Needs    Financial resource strain: Not on file    Food insecurity:     Worry: Not on file     Inability: Not on file    Transportation needs:     Medical: Not on file     Non-medical: Not on file   Tobacco Use    Smoking status: Never Smoker    Smokeless tobacco: Never Used   Substance and Sexual Activity    Alcohol use: No    Drug use: No    Sexual activity: Not on file   Lifestyle    Physical activity:     Days per week: Not on file     Minutes per session: Not on file    Stress: Not on file   Relationships    Social connections:     Talks on phone: Not on file     Gets together: Not on file     Attends Confucianism service: Not on file     Active member of club or organization: Not on file Attends meetings of clubs or organizations: Not on file     Relationship status: Not on file    Intimate partner violence:     Fear of current or ex partner: Not on file     Emotionally abused: Not on file     Physically abused: Not on file     Forced sexual activity: Not on file   Other Topics Concern    Not on file   Social History Narrative    Not on file       Allergies: I have reviewed the allergy hx  Allergies   Allergen Reactions    Ceftin [Cefuroxime Axetil] Anaphylaxis and Angioedema    Codeine Hives    Dilaudid [Hydromorphone] Anaphylaxis and Angioedema    Hydrocodone Hives    Meperidine Hives    Oxycodone Hives    Sulfa (Sulfonamide Antibiotics) Hives    Adhesive Other (comments)    Cephalosporins Rash and Itching    Linezolid Nausea and Vomiting       Medications:  I have reviewed the patient's medications  Prior to Admission medications    Medication Sig Start Date End Date Taking? Authorizing Provider   alteplase (CATHFLO ACTIVASE) 2 mg injection Cathflo Activase 2 mg intra-catheter solution   Yes Provider, Historical   atorvastatin (LIPITOR) 10 mg tablet atorvastatin 10 mg tablet via oral route once a day   Yes Provider, Historical   cranberry extract 425 mg cap Take 425 mg by mouth daily. Yes Provider, Historical   fluconazole (DIFLUCAN) 150 mg tablet fluconazole 150 mg tablet as needed   Yes Provider, Historical   levothyroxine (SYNTHROID) 50 mcg tablet take 1 tablet by oral route one day and 0.5 tab the next and continue in alternating fashion 12/21/18  Yes Provider, Historical   meropenem (MERREM) injection 1 g by IntraVENous route every eight (8) hours. 4/29/19  Yes Provider, Historical   montelukast (SINGULAIR) 10 mg tablet Take 1 Tab by mouth.    Yes Provider, Historical   rizatriptan (MAXALT) 10 mg tablet TAKE 1 TABLET BY ORAL ROUTE ONCE, MAY REPEAT AT 2 HOUR INTERVALS DO NOT EXCEED 30 MG IN 24 HOURS 4/17/19  Yes Provider, Historical   meloxicam (MOBIC) 7.5 mg tablet Take 7.5 mg by mouth daily. Yes Provider, Historical   cyclobenzaprine (FLEXERIL) 10 mg tablet Take 10 mg by mouth two (2) times a day. Yes Provider, Historical   sertraline (ZOLOFT) 100 mg tablet Take 100 mg by mouth daily. Yes Provider, Historical   sertraline (ZOLOFT) 50 mg tablet Take 50 mg by mouth daily. Yes Provider, Historical   clopidogrel (PLAVIX) 75 mg tab Take 75 mg by mouth daily. Yes Provider, Historical   metFORMIN (GLUMETZA ER) 500 mg TG24 24 hour tablet Take 1 Tab by mouth two (2) times a day. Yes Provider, Historical   lansoprazole (PREVACID) 30 mg capsule Take 30 mg by mouth two (2) times a day. Yes Provider, Historical   fluticasone-vilanterol (BREO ELLIPTA) 100-25 mcg/dose inhaler Take 1 Puff by inhalation daily. 8/22/18  Yes Donna Moreno MD   diazepam (VALIUM) 2 mg tablet Take 1 Tab by mouth every six (6) hours as needed for Sleep. Max Daily Amount: 8 mg. 4/2/16  Yes Alena Pizarro MD   traMADol (ULTRAM) 50 mg tablet Take 2 Tabs by mouth every six (6) hours as needed. Max Daily Amount: 400 mg. 4/2/16  Yes Alena Pizarro MD   ergocalciferol (ERGOCALCIFEROL) 50,000 unit capsule Take 50,000 Units by mouth every seven (7) days. Indications: PREVENTION OF VITAMIN D DEFICIENCY   Yes Provider, Historical   promethazine (PHENERGAN) 25 mg tablet Take 25 mg by mouth every six (6) hours as needed for Nausea. Indications: nausea   Yes Provider, Historical   albuterol (PROVENTIL HFA, VENTOLIN HFA, PROAIR HFA) 90 mcg/actuation inhaler Take  by inhalation every four (4) hours as needed for Wheezing or Shortness of Breath. Yes Provider, Historical   co-enzyme Q-10 (CO Q-10) 100 mg capsule Take 100 mg by mouth daily. Yes Provider, Historical   immune globulin 10% (GAMMAGARD) 10 % infusion 40 g by IntraVENous route two (2) times a week. Indications: [address]   Yes Provider, Historical   guaiFENesin (MUCINEX) 1,200 mg Ta12 ER tablet Take 1,200 mg by mouth as needed for Congestion.    Yes Provider, Historical   diazepam (VALIUM) 2 mg tablet Take 2 mg by mouth every six (6) hours as needed for Sleep. Yes Provider, Historical   vitamin c-vitamin e cap Take 1 Tab by mouth as needed. Yes Provider, Historical   Biotin 2,500 mcg cap Take 1,000 mg by mouth daily. Yes Provider, Historical   acetaminophen (TYLENOL) 500 mg tablet Take 1,000 mg by mouth every six (6) hours as needed for Pain. Yes Provider, Historical   traMADol (ULTRAM) 50 mg tablet Take 50 mg by mouth every six (6) hours as needed. Yes Provider, Historical   propranolol LA (INDERAL LA) 120 mg SR capsule Take 120 mg by mouth daily. Yes Provider, Historical   multivitamin (ONE A DAY) tablet Take 1 Tab by mouth daily. Yes Provider, Historical   gabapentin (NEURONTIN) 300 mg capsule Take 300 mg by mouth three (3) times daily. Yes Provider, Historical   modafinil (PROVIGIL) 200 mg tablet Take 200 mg by mouth daily. Yes Provider, Historical   simvastatin (ZOCOR) 40 mg tablet Take 20 mg by mouth nightly. Provider, Historical   vancomycin 1,000 mg 1,000 mg IVPB 1 g by IntraNASal route as needed (mixed with Amphotericin  for nasal wash). Provider, Historical   DULoxetine (CYMBALTA) 30 mg capsule Take 30 mg by mouth daily. Provider, Historical   zolpidem (AMBIEN) 10 mg tablet Take  by mouth nightly as needed. Provider, Historical   celecoxib (CELEBREX) 200 mg capsule Take  by mouth two (2) times a day. Provider, Historical   aspirin 81 mg tablet Take 81 mg by mouth. Provider, Historical       Immunizations:  I have reviewed the patient's immunizations    There is no immunization history on file for this patient. Review of Systems:  A complete review of systems was performed as stated in the HPI, all others are negative.       Objective:    Physical Exam:  /80 (BP 1 Location: Right arm, BP Patient Position: At rest)   Pulse 83   Temp 98.3 °F (36.8 °C) (Oral)   Resp 20   Ht 5' 2\" (1.575 m) Wt 64.9 kg (143 lb)   SpO2 99%   BMI 26.16 kg/m²   Vitals were personally reviewed  Gen: no acute distress, pleasant and cooperative, sitting up in chair, able to climb to exam table w/o difficulty  HEENT: normocephalic/atraumatic, PERRLA, EOMI, no scleral icteru, no oral lesions, good dentition, Mallampati III  Neck: supple, trachea midline, no JVD, no cervical and supraclavicular adenopathy  Chest: no lesions, with even rise and fall, no pectus excavatum or flail chest, port is in place of her right upper chest  CVS: regular rate rhythm, S1/S2, no murmurs/rubs/gallops  Lungs: good air entry B/L, CTABL, no wheezes/rales/rhonchi  Back: no kyphosis or scoliosis  Abdomen: soft, nontender, bowel sounds present, no hepatosplenomegaly  Ext: no pitting edema B/L, no peripheral cyanosis or clubbing  Neuro: grossly normal, AAOx3, normal strength and coordination grossly, no focal deficits  Skin: no rashes, erythema, lesions  Psych: normal memory, thought content, and processing    Labs: I have reviewed the patient's available labs --found in care everywhere from [facility] from 4/6/2019, CBC shows mild anemia, no leukopenia or thrombocytopenia; BMP shows normal electrolytes and renal function  Lab Results   Component Value Date/Time    WBC 7.6 03/25/2016 12:54 PM    HGB 13.7 03/25/2016 12:54 PM    HCT 42.9 03/25/2016 12:54 PM    PLATELET 177 91/25/5690 12:54 PM    MCV 89.6 03/25/2016 12:54 PM     Lab Results   Component Value Date/Time    Sodium 136 03/25/2016 12:54 PM    Potassium 4.3 03/25/2016 12:54 PM    Chloride 102 03/25/2016 12:54 PM    CO2 29 03/25/2016 12:54 PM    Glucose 103 03/25/2016 12:54 PM    BUN 13 03/25/2016 12:54 PM    Creatinine 1.0 03/25/2016 12:54 PM    GFR est AA >60.0 03/25/2016 12:54 PM    GFR est non-AA 60 03/25/2016 12:54 PM    Calcium 9.0 03/25/2016 12:54 PM    Bilirubin, total 0.4 03/25/2016 12:54 PM    AST (SGOT) 48 (H) 03/25/2016 12:54 PM    Alk.  phosphatase 151 (H) 03/25/2016 12:54 PM    Protein, total 8.3 (H) 03/25/2016 12:54 PM    Albumin 3.3 (L) 03/25/2016 12:54 PM    ALT (SGPT) 45 03/25/2016 12:54 PM       Previously reviewed records -- Records from her Pulmonologist - Dr. Keenan Mosher with [facility] -- seen on 9/13/16 - noted to have chronic cough - attempted to try Spiriva. Noted that pt had GERD but could not afford Dexilant. Pt on IVIG for CVID. Given Tessalon pearls. Pt seen on 4/19/18 by the PA - Colette Townsend -- pt reported SOB with exertion on treadmill -- they did doppler to rule out VTE  PFTs from that day show normal ratio - normal FEV1, normal FVC, normal TLC, and decreased DLCO to 56% uncorrected  Pt then saw Dr. Keenan Mosher on 5/22/18 -- pt had continued TAYLOR and cough. He wanted to rule out pHTN with TTE. Pt emotional during this visit per records. D-dimer was negative  Pt had 6MWT, which showed SpO2 went from 98% to 93%, no SpO2 <88%    Imaging:  I have personally reviewed patient's imaging --CT chest with IV contrast from 9/21/2018 shows clear lung fields bilaterally without any evidence of bronchiectasis or emphysema. There are no nodules, consolidations, masses. Official report per radiology:  CT Results (most recent):  Results from Hospital Encounter encounter on 09/21/18   CT CHEST W CONT    Narrative CT chest with contrast    HISTORY: Shortness of breath, bronchiectasis. History of lupus    COMPARISON: None. TECHNIQUE: Helical scans through the chest was obtained from the thoracic inlet  to the diaphragm after uneventful nonionic IV contrast administration. All CT scans at this facility performed using dose optimization techniques as  appreciated to a performed exam, to include automated exposure control,  adjustment of the mA and or KU according to patient size (including appropriate  matching for site specific examination), or use of iterative reconstruction  technique. CONTRAST: 100 cc Isovue 300.     FINDINGS:     Lung Parenchyma: Clear.    Thyroid: Very atrophic. Mediastinum: No adenopathy. Pleural Space And Chest Wall: No significant pleural pathology. Heart: The heart and the pericardium appear normal.    Vasculature: The aorta and the great vessels are unremarkable. Imaged Upper Abdomen: Status post cholecystectomy noted. Osseous Structures: Unremarkable for age. Impression IMPRESSION:     1. No significant pulmonary or chest finding. Thank you for your referral.        PFTs: Complete PFTs from today show normal spirometry, without BD response, normal lung volumes, diffusion capacity is mildly reduced. TTE:  I have reviewed the patient's TTE results  From care everywhere done at [facility] from 11/15/2018  Component Name Value Ref Range   EF Echo 60     Result Impression   :   NORMAL LEFT VENTRICULAR CAVITY SIZE AND SYSTOLIC FUNCTION   VISUALLY ESTIMATED EJECTION FRACTION 33-37%   NORMAL DIASTOLIC FUNCTION   SCLEROTIC TRILEAFLET AORTIC VALVE   MILD AORTIC REGURGITATION.    RVSP 15-20 MMHG   NEGATIVE BUBBLE STUDY X2     NO PREVIOUS REPORT FOR COMPARISON     Clinical Indications: Stroke/TIA/Peripheral Embolic Event   ICD Codes: Technical Quality: Fair     MEASUREMENTS:   2D ECHO    LV Diastolic Diameter Base LX     3.9 IX                2.3-6.0   LV Systolic Diameter Base JX      9.9 cm                2.3-4.0   Fractional Shortening BASE LX     0.2   IVS Diastolic Thickness           1.1 cm                0.6-1.1 cm   LVPW Diastolic [id number]          9.16 cm               0.6-1.1 cm   IVS to PW Ratio                   1.1   LA Systolic Diameter LX           2.6 cm                2.1-3.7 cm   LA Ao Ratio                       0.84   Aortic Root Diameter              3.1 OB                1.4-7.1 cm   RA Systolic Pressure              5 mmHg   LA Systolic VCRABRFP              93.8 mmHg   LA Area 4C View                   10.3 cm²   LA Area 2C View                   14 cm²   LA Length 4C                      3.9 cm   LA Length 2C                      5 cm   LA Volume                         22 cm³   PV Peak Gradient                  3 mmHg     DOPPLER    AV Peak Velocity                  113 cm/s   AV Peak Gradient                  5.1 mmHg   LVOT Peak Velocity                92.3 cm/s   LVOT Peak Gradient                3.4 mmHg   Mitral E Point Velocity           65.6 cm/s   Mitral  A Point Velocity          81.4 cm/s   Mitral A Duration                 158 ms   Mitral E to A Ratio               0.81                  1.0 to 1.5   Mitral E to LV E' Septal Ratio    11.2   Mitral E to LV E' Lateral Ratio   11.8   MV Deceleration Time              267 ms                160-240 ms   Pulm Vein Atrial Reversal Veloci  30.6 cm/s             <35 cm/s   Pulm Vein Atrial Duration         153 ms   E Prime Velocity                  5.6 cm/s   PV Peak Velocity                  85.5 cm/s   PV Peak Gradient                  2.9 mmHg   RA Pressure (Entered Value)       3 mmHg   TR Peak Velocity                  1.7 m/s   TR Peak DSIYUBKI                  00.4 mmHg   RV Systolic WWMCSDNS              68.9 mmHg       FINDINGS:     Left Ventricle: Normal left ventricular cavity size. Normal left ventricular wall thickness. Left Ventricle The left ventricular systolic function is normal. Visually estimated ejection fraction 55-60% Normal   Function: diastolic function. Septal bounce noted. False tendon noted. LVEF: 60 %       Left Atrium: The left atrium is normal in size with a left atrial index of 13 ml/m2. Right Ventricle: The right ventricle is normal in size. Normal right ventricular function. TAPSE is 1.9 cm and TAPSV   is 10.0 cm/s. RVSP 15-20 mmHg   Right Atrium: The right atrium is normal in size. Normal inferior vena cava size and collapse. Mitral Valve: The mitral leaflets are normal. No regurgitation or stenosis. Aortic Valve: sclerotic trileaflet aortic valve. Mild aortic regurgitation.  No evidence of stenosis. Tricuspid Valve: The tricuspid valve is structurally normal. Trace tricuspid regurgitation. Pulmonic Valve: The pulmonic valve is structurally normal. Trace pulmonic regurgitation. Aorta: Normal aortic root diameter. Pericardium: No pericardial effusion. Masses / Shunts: No masses, shunts or thrombi seen.  Negative Bubble Study X2. Ermelinda Romano MD   (Electronically Signed)   Final Date: 15 November 2018 17:02   Other Result Information   This result has an attachment that is not available. Result Narrative        ECHOCARDIOGRAPHIC REPORT     Exam Date: 2018-11-15 08:02 Gender: F Referring Physician: Dalia Vazquez   Name: Tesfaye Jhon :  Sonographer: Maki Cherry   CPI: [id number] BP: 107 / 59 Ht: 62 Wt: 148 BSA: 1.71     Type of Exam: ECHO PER STROKE PROTOCOL   Procedure: 2-D echocardiogram,Color flow analysis, Spectral Doppler analysis, Bubble study   ____________________________________________________________________________________________________   __   Other Result Information   Vashti Broussard - 11/15/2018  6:32 PM EST       ECHOCARDIOGRAPHIC REPORT     Exam Date: 2018-11-15 08:02 Gender: F Referring Physician: Dalia Vazquez   Name: Tesfaye John :  Sonographer: Maki Cherry   CPI: [id number] BP: 107 / 59 Ht: 62 Wt: 148 BSA: 1.71     Type of Exam: ECHO PER STROKE PROTOCOL   Procedure: 2-D echocardiogram,Color flow analysis, Spectral Doppler analysis, Bubble study  ____________________________________________________________________________________________________   __   IMPRESSION:   NORMAL LEFT VENTRICULAR CAVITY SIZE AND SYSTOLIC FUNCTION   VISUALLY ESTIMATED EJECTION FRACTION 86-74%   NORMAL DIASTOLIC FUNCTION   SCLEROTIC TRILEAFLET AORTIC VALVE   MILD AORTIC REGURGITATION.    RVSP 15-20 MMHG   NEGATIVE BUBBLE STUDY X2     NO PREVIOUS REPORT FOR COMPARISON     Clinical Indications: Stroke/TIA/Peripheral Embolic Event ICD Codes: Technical Quality: Fair     MEASUREMENTS:   2D ECHO    LV Diastolic Diameter Base LX     3.9 cm                9.0-9.2   LV Systolic Diameter Base LX      3.1 cm                2.3-4.0   Fractional Shortening BASE LX     0.2   IVS Diastolic Thickness           1.1 cm                0.6-1.1 cm   LVPW Diastolic Thickness          0.98 cm               0.6-1.1 cm   IVS to PW Ratio                   1.1   LA Systolic Diameter LX           2.6 cm                2.1-3.7 cm   LA Ao Ratio                       0.84   Aortic Root Diameter              3.1 cm                5.8-4.9 cm   RA Systolic Pressure              5 mmHg   LA Systolic Pressure              16.6 mmHg   LA Area 4C View                   10.3 cm²   LA Area 2C View                   14 cm²   LA Length 4C                      3.9 cm   LA Length 2C                      5 cm   LA Volume                         22 cm³   PV Peak Gradient                  3 mmHg     DOPPLER    AV Peak Velocity                  113 cm/s   AV Peak Gradient                  5.1 mmHg   LVOT Peak Velocity                92.3 cm/s   LVOT Peak Gradient                3.4 mmHg   Mitral E Point Velocity           65.6 cm/s   Mitral  A Point Velocity          81.4 cm/s   Mitral A Duration                 158 ms   Mitral E to A Ratio               0.81                  1.0 to 1.5   Mitral E to LV E' Septal Ratio    11.2   Mitral E to LV E' Lateral Ratio   11.8   MV Deceleration Time              267 ms                160-240 ms   Pulm Vein Atrial Reversal Veloci  30.6 cm/s             <35 cm/s   Pulm Vein Atrial Duration         153 ms   E Prime Velocity                  5.6 cm/s   PV Peak Velocity                  85.5 cm/s   PV Peak Gradient                  2.9 mmHg   RA Pressure (Entered Value)       3 mmHg   TR Peak Velocity                  1.7 m/s   TR Peak Gradient                  35.7 mmHg   RV Systolic Pressure              14.6 mmHg       FINDINGS:     Left Ventricle: Normal left ventricular cavity size. Normal left ventricular wall thickness. Left Ventricle The left ventricular systolic function is normal. Visually estimated ejection fraction 55-60% Normal   Function: diastolic function. Septal bounce noted. False tendon noted. LVEF: 60 %       Left Atrium: The left atrium is normal in size with a left atrial index of 13 ml/m2. Right Ventricle: The right ventricle is normal in size. Normal right ventricular function. TAPSE is 1.9 cm and TAPSV   is 10.0 cm/s. RVSP 15-20 mmHg   Right Atrium: The right atrium is normal in size. Normal inferior vena cava size and collapse. Mitral Valve: The mitral leaflets are normal. No regurgitation or stenosis. Aortic Valve: sclerotic trileaflet aortic valve. Mild aortic regurgitation. No evidence of stenosis. Tricuspid Valve: The tricuspid valve is structurally normal. Trace tricuspid regurgitation. Pulmonic Valve: The pulmonic valve is structurally normal. Trace pulmonic regurgitation. Aorta: Normal aortic root diameter. Pericardium: No pericardial effusion. Masses / Shunts: No masses, shunts or thrombi seen. Negative Bubble Study X2. Porsha Munoz MD   (Electronically Signed)   Final Date: 15 November 2018 17:02         Assessment and Plan:  58 y.o. female with:    Impression:  1. Shortness of breath/dyspnea on exertion:  Etiology unclear but appears to be multifactorial.  CT scan does not show any evidence of ILD, and no evidence of bronchiectasis. Work-up from her previous providers show no evidence of VTE or pulmonary hypertension. Echo from November 2018 shows no evidence of cardiomyopathy. I suspect this may be driven by deconditioning from her chronic conditions. I also think there is a component of asthma/reactive airway disease given her improvement with as needed albuterol, however patient is not noted any improvement while on low-dose Breo.   2.  Chronic cough: Etiology unclear, I suspect this is due to LPR/GERD given that it occurs at nighttime. Unfortunately this is not resolved with PPI. Patient saw GI, but declined pH/impedance testing. 3.  CVID with hypogammaglobulinemia:    Pt on IVIG supplementation  4. Hx of SLE:  Pt not on DMARD therapy, was on plaquenil prior  5. Mild to moderate persistent asthma suspected  6. Reduced diffusion capacity:  Moderate on last PFTs - unclear etiology, no significant desat with 6MWT  7. LPR/GERD:  Poorly controlled on BID PPI. Patient declined pH impedance testing  8. Reduced DLCO, mild    Plan:  -Discussed patient's dyspnea, noted to patient that it is likely multifactorial.  We will maximize her asthma therapy with Breo 200/25 MCG 1 puff once daily. Counseled patient to rinse mouth thoroughly after each use. I will see her in follow-up soon, if no improvement to her symptoms, we will consider CPET testing at that time. Reviewed risks and benefits of CPET testing.  -Reviewed CT scan results with patient. Advised patient that there is no bronchiectasis found, so no indication for airway clearance at this time.  -Continue Albuterol HFA 1-2puffs q4-6h PRN. Counseled patient that this is their rescue inhaler. Also counseled patient regarding premedication 15-30m prior to exercise or exposure to very cold air  -Counseled patient on proper inhaler technique  -Counseled patient to avoid potential triggers of asthma.  -Continue Singulair 10mg daily  -Counseled on reflux lifestyle precautions and to continue PPI  -Refer patient to GI for further workup and management of GERD -- pH Bravo testing  -Continue IVIG infusions q2 weeks by Dr. Chavo Tavera  -Management of SLE per Dr. Isrrael Turner    Follow-up and Dispositions    · Return in about 2 months (around 7/3/2019).        Orders Placed This Encounter    montelukast (SINGULAIR) 10 mg tablet    fluticasone furoate-vilanterol (BREO ELLIPTA) 200-25 mcg/dose inhaler    fluticasone furoate-vilanterol (BREO ELLIPTA) 200-25 mcg/dose inhaler Chema Andrade MD/MPH     Pulmonary, Critical Care Medicine  [facility]

## 2019-10-10 ENCOUNTER — OFFICE VISIT (OUTPATIENT)
Dept: PULMONOLOGY | Age: 64
End: 2019-10-10

## 2019-10-10 DIAGNOSIS — R05.3 CHRONIC COUGH: ICD-10-CM

## 2019-10-10 DIAGNOSIS — R06.83 SNORING: ICD-10-CM

## 2019-10-10 DIAGNOSIS — J32.9 CHRONIC SINUSITIS, UNSPECIFIED LOCATION: ICD-10-CM

## 2019-10-10 DIAGNOSIS — R06.09 DYSPNEA ON EXERTION: Primary | ICD-10-CM

## 2019-10-10 DIAGNOSIS — D80.1 HYPOGAMMAGLOBULINEMIA (HCC): ICD-10-CM

## 2019-10-10 DIAGNOSIS — G47.19 EXCESSIVE DAYTIME SLEEPINESS: ICD-10-CM

## 2019-10-10 DIAGNOSIS — J45.30 MILD PERSISTENT ASTHMA WITHOUT COMPLICATION: ICD-10-CM

## 2019-10-10 DIAGNOSIS — R06.02 SHORTNESS OF BREATH: ICD-10-CM

## 2019-10-10 RX ORDER — ROSUVASTATIN CALCIUM 5 MG/1
5 TABLET, COATED ORAL DAILY
COMMUNITY
Start: 2011-05-03

## 2019-10-10 RX ORDER — ESTRADIOL 10 UG/1
INSERT VAGINAL
COMMUNITY
Start: 2011-04-27 | End: 2019-10-10

## 2019-10-10 RX ORDER — LINEZOLID 600 MG/1
TABLET, FILM COATED ORAL
COMMUNITY
Start: 2011-02-22 | End: 2019-10-10

## 2019-10-10 RX ORDER — ROPINIROLE 0.25 MG/1
0.25 TABLET, FILM COATED ORAL
COMMUNITY
Start: 2011-05-04

## 2019-10-10 RX ORDER — CHLOROQUINE PHOSPHATE 250 MG/1
TABLET, FILM COATED ORAL
COMMUNITY
Start: 2011-05-05 | End: 2019-10-10

## 2019-10-10 RX ORDER — ELETRIPTAN HYDROBROMIDE 40 MG/1
TABLET, FILM COATED ORAL
COMMUNITY
Start: 2011-04-15 | End: 2019-10-10

## 2019-10-10 RX ORDER — GENTAMICIN SULFATE 590MCG/MG
1 POWDER (GRAM) MISCELLANEOUS 2 TIMES DAILY
COMMUNITY
Start: 2019-07-16 | End: 2020-10-12 | Stop reason: ALTCHOICE

## 2019-10-10 RX ORDER — TRIAMCINOLONE ACETONIDE 1 MG/G
PASTE DENTAL
COMMUNITY
Start: 2019-08-14 | End: 2020-10-12

## 2019-10-10 RX ORDER — CLARITHROMYCIN 500 MG/1
TABLET, FILM COATED ORAL
COMMUNITY
End: 2019-10-10 | Stop reason: ALTCHOICE

## 2019-10-10 RX ORDER — PREDNISONE 10 MG/1
TABLET ORAL
COMMUNITY
Start: 2019-10-10 | End: 2019-10-10

## 2019-10-10 RX ORDER — DEXLANSOPRAZOLE 60 MG/1
CAPSULE, DELAYED RELEASE ORAL
COMMUNITY
Start: 2011-01-27 | End: 2019-10-10

## 2019-10-10 RX ORDER — BUDESONIDE 0.5 MG/2ML
INHALANT ORAL
COMMUNITY
Start: 2019-10-10 | End: 2019-10-10

## 2019-10-10 RX ORDER — NITROGLYCERIN 0.4 MG/1
TABLET SUBLINGUAL
COMMUNITY
Start: 2020-10-12 | End: 2020-10-12

## 2019-10-10 RX ORDER — FUROSEMIDE 20 MG/1
TABLET ORAL
COMMUNITY
Start: 2011-03-02 | End: 2019-10-10

## 2019-10-10 RX ORDER — MOMETASONE FUROATE 50 UG/1
SPRAY, METERED NASAL
COMMUNITY
Start: 2011-03-18 | End: 2019-10-10

## 2019-10-10 RX ORDER — VENLAFAXINE HYDROCHLORIDE 150 MG/1
CAPSULE, EXTENDED RELEASE ORAL
COMMUNITY
Start: 2011-01-27 | End: 2019-10-10

## 2019-10-10 RX ORDER — NITROFURANTOIN 25; 75 MG/1; MG/1
CAPSULE ORAL
COMMUNITY
Start: 2011-05-13 | End: 2019-10-10

## 2019-10-10 RX ORDER — LUBIPROSTONE 24 UG/1
CAPSULE, GELATIN COATED ORAL
COMMUNITY
Start: 2011-04-25 | End: 2019-10-10

## 2019-10-10 RX ORDER — BUTALBITAL, ACETAMINOPHEN AND CAFFEINE 50; 325; 40 MG/1; MG/1; MG/1
TABLET ORAL
COMMUNITY
Start: 2011-01-21 | End: 2019-10-10

## 2019-10-10 NOTE — PROGRESS NOTES
Cameron Schumacher presents today for   Chief Complaint   Patient presents with    Asthma     follow up from 5/3/2019    Cough    Breathing Problem     SOB, TAYLOR    Other     hypogammaglobulinemia       Is someone accompanying this pt? Yes. Spouse    Is the patient using any DME equipment during OV? No    -DME Company N/A    Depression Screening:  3 most recent PHQ Screens 10/10/2019   Little interest or pleasure in doing things Not at all   Feeling down, depressed, irritable, or hopeless Not at all   Total Score PHQ 2 0       Learning Assessment:  Learning Assessment 5/3/2019   PRIMARY LEARNER Patient   PRIMARY LANGUAGE ENGLISH   LEARNER PREFERENCE PRIMARY DEMONSTRATION     LISTENING     PICTURES     READING     VIDEOS   ANSWERED BY Patient   RELATIONSHIP SELF       Abuse Screening:  Abuse Screening Questionnaire 10/10/2019   Do you ever feel afraid of your partner? N   Are you in a relationship with someone who physically or mentally threatens you? N   Is it safe for you to go home? Y       Fall Risk  Fall Risk Assessment, last 12 mths 10/10/2019   Able to walk? Yes   Fall in past 12 months? Yes   Fall with injury? Yes   Number of falls in past 12 months 8 or more   Fall Risk Score 9         Coordination of Care:  1. Have you been to the ER, urgent care clinic since your last visit? Hospitalized since your last visit? No    2. Have you seen or consulted any other health care providers outside of the [address] [place] [address] since your last visit? Include any pap smears or colon screening. Yes.  Dr. Michael Perea, PCP, Dr. Radha Elias, rheumatologist, Dr. William Haas, Dr. Inga Redmond, cardiothoracic surgeon

## 2019-10-10 NOTE — PROGRESS NOTES
[address] [facility], [facility] [address] [facility]  Pulmonary, Critical Care, and Sleep Medicine    Pulmonary Office F/U  Name: Malcolm Campbell 71 y.o. female  MRN: [id number]  : 1955  Service Date: 10/10/19  Chief Complaint:   Chief Complaint   Patient presents with    Asthma     follow up from 5/3/2019    Cough    Breathing Problem     SOB, TAYLOR    Other     hypogammaglobulinemia       History of Present Illness:  Malcolm Campbell is a 71 y.o. female, who presents to Pulmonary clinic for followup of bronchiectasis and shortness of breath. Patient was last seen on 5/3/2019. In the interval, patient reports she is doing very well. She reports no respiratory issues. She does report with regards to her sinuses, patient has developed infection into her bones. She notes that meropenem was discontinued and patient was started on gentamicin nasal irrigation. Patient was also started on XHance nasal spray. Patient continues to get IVIG every 2 weeks. Patient reports no changes to her shortness of breath, notes compliance to [facility]. She has used PRN albuterol about 3-4x in the last month. No exacerbations. Patient reports dyspnea with exertion mainly, still occurs with moderate exertion. Patient's chronic cough has reduced some, nonproductive. No other modifying factors  Pt denies N/V/D, chest pain/angina, night sweats, hemoptysis, sputum, abdominal pain, LE swelling.       Past Medical History:   Diagnosis Date    Anxiety     Aortic insufficiency     Chronic lung disease     CVA (cerebral vascular accident) ([facility]     3 strokes and multiple transient ischemic attacks    Depression     anxiety    DOS (diffuse esophageal spasm)     Esophageal reflux     Fibromyalgia     Ganglion     Heart disease     Heart murmur     Hx MRSA infection     Hypercholesterolemia     Hypogammaglobulinemia (HCC)     IBS (irritable bowel syndrome)     [facility]     Nausea and vomiting     SLE (systemic lupus erythematosus) [facility]     Staphylococcus infection of nose 2009    Thyroid disease      Past Surgical History:   Procedure Laterality Date    HX  SECTION      HX CHOLECYSTECTOMY      HX HYSTERECTOMY      HX RHINOPLASTY      HX TONSILLECTOMY      HX TUBAL LIGATION      HX VASCULAR ACCESS Right 2011     Family History   Problem Relation Age of Onset    Heart Disease Mother     Diabetes Mother     Diabetes Father     Hypertension Father     Diabetes Maternal Aunt     Heart Disease Maternal Grandmother     Diabetes Maternal Grandmother     Stroke Maternal Grandmother     Diabetes Maternal Grandfather     Heart Disease Maternal Grandfather     Stroke Maternal Grandfather      Social History     Socioeconomic History    Marital status:      Spouse name: Not on file    Number of children: Not on file    Years of education: Not on file    Highest education level: Not on file   Occupational History    Not on file   Social Needs    Financial resource strain: Not on file    Food insecurity:     Worry: Not on file     Inability: Not on file    Transportation needs:     Medical: Not on file     Non-medical: Not on file   Tobacco Use    Smoking status: Never Smoker    Smokeless tobacco: Never Used   Substance and Sexual Activity    Alcohol use: No    Drug use: No    Sexual activity: Not on file   Lifestyle    Physical activity:     Days per week: Not on file     Minutes per session: Not on file    Stress: Not on file   Relationships    Social connections:     Talks on phone: Not on file     Gets together: Not on file     Attends Restorationist service: Not on file     Active member of club or organization: Not on file     Attends meetings of clubs or organizations: Not on file     Relationship status: Not on file    Intimate partner violence:     Fear of current or ex partner: Not on file     Emotionally abused: Not on file Physically abused: Not on file     Forced sexual activity: Not on file   Other Topics Concern    Not on file   Social History Narrative    Not on file       Allergies: I have reviewed the allergy hx  Allergies   Allergen Reactions    Ceftin [Cefuroxime Axetil] Anaphylaxis and Angioedema    Codeine Hives    Dilaudid [Hydromorphone] Anaphylaxis and Angioedema    Hydrocodone Hives    Meperidine Hives    Oxycodone Hives    Sulfa (Sulfonamide Antibiotics) Hives    Adhesive Other (comments)    Cephalosporins Rash and Itching    Linezolid Nausea and Vomiting       Medications:  I have reviewed the patient's medications  Prior to Admission medications    Medication Sig Start Date End Date Taking?  Authorizing Provider   butalbital-acetaminophen-caffeine (FIORICET, ESGIC) -40 mg per tablet  1/21/11  Yes Provider, Historical   chloroquine (ARALEN) 250 mg tablet  5/5/11  Yes Provider, Historical   dexlansoprazole (DEXILANT) 60 mg CpDB capsule (delayed release)  1/27/11  Yes Provider, Historical   eletriptan (RELPAX) 40 mg tablet  4/15/11  Yes Provider, Historical   estradiol (VAGIFEM) 10 mcg tab vaginal tablet  4/27/11  Yes Provider, Historical   furosemide (LASIX) 20 mg tablet  3/2/11  Yes Provider, Historical   mometasone (NASONEX) 50 mcg/actuation nasal spray  3/18/11  Yes Provider, Historical   lubiPROStone (AMITIZA) 24 mcg capsule  4/25/11  Yes Provider, Historical   linezolid (ZYVOX) 600 mg tablet  2/22/11  Yes Provider, Historical   nitrofurantoin, macrocrystal-monohydrate, (MACROBID) 100 mg capsule  5/13/11  Yes Provider, Historical   rOPINIRole (REQUIP) 0.25 mg tablet ropinirole 0.25 mg tablet 5/4/11  Yes Provider, Historical   rosuvastatin (CRESTOR) 5 mg tablet  5/3/11  Yes Provider, Historical   triamcinolone acetonide (KENALOG) 0.1 % dental paste triamcinolone acetonide 0.1 % dental paste 8/14/19  Yes Provider, Historical   venlafaxine-SR (EFFEXOR-XR) 150 mg capsule  1/27/11  Yes Provider, Historical   budesonide (PULMICORT) 0.5 mg/2 mL nbsp budesonide 0.5 mg/2 mL suspension for nebulization    Provider, Historical   doxylamine succinate (UNISOM, DOXYLAMINE,) 25 mg tablet Unisom (doxylamine) 25 mg tablet   Take by oral route. Provider, Historical   nitroglycerin (NITROSTAT) 0.4 mg SL tablet nitroglycerin 0.4 mg sublingual tablet    Provider, Historical   predniSONE (DELTASONE) 10 mg tablet prednisone 10 mg tablet    Provider, Historical   alteplase (CATHFLO ACTIVASE) 2 mg injection Cathflo Activase 2 mg intra-catheter solution    Provider, Historical   atorvastatin (LIPITOR) 10 mg tablet atorvastatin 10 mg tablet via oral route once a day    Provider, Historical   cranberry extract 425 mg cap Take 425 mg by mouth daily. Provider, Historical   fluconazole (DIFLUCAN) 150 mg tablet fluconazole 150 mg tablet as needed    Provider, Historical   levothyroxine (SYNTHROID) 50 mcg tablet take 1 tablet by oral route one day and 0.5 tab the next and continue in alternating fashion 12/21/18   Provider, Historical   meropenem (MERREM) injection 1 g by IntraVENous route every eight (8) hours. 4/29/19   Provider, Historical   montelukast (SINGULAIR) 10 mg tablet Take 1 Tab by mouth. Provider, Historical   rizatriptan (MAXALT) 10 mg tablet TAKE 1 TABLET BY ORAL ROUTE ONCE, MAY REPEAT AT 2 HOUR INTERVALS DO NOT EXCEED 30 MG IN 24 HOURS 4/17/19   Provider, Historical   fluticasone furoate-vilanterol (BREO ELLIPTA) 200-25 mcg/dose inhaler Take 1 Puff by inhalation daily. 5/3/19   Baldemar Benito MD   meloxicam (MOBIC) 7.5 mg tablet Take 7.5 mg by mouth daily. Provider, Historical   cyclobenzaprine (FLEXERIL) 10 mg tablet Take 10 mg by mouth two (2) times a day. Provider, Historical   sertraline (ZOLOFT) 100 mg tablet Take 100 mg by mouth daily. Provider, Historical   sertraline (ZOLOFT) 50 mg tablet Take 50 mg by mouth daily.     Provider, Historical   clopidogrel (PLAVIX) 75 mg tab Take 75 mg by mouth daily.    Provider, Historical   metFORMIN (GLUMETZA ER) 500 mg TG24 24 hour tablet Take 1 Tab by mouth two (2) times a day. Provider, Historical   lansoprazole (PREVACID) 30 mg capsule Take 30 mg by mouth two (2) times a day. Provider, Historical   diazepam (VALIUM) 2 mg tablet Take 1 Tab by mouth every six (6) hours as needed for Sleep. Max Daily Amount: 8 mg. 4/2/16   Kia Miranda MD   traMADol (ULTRAM) 50 mg tablet Take 2 Tabs by mouth every six (6) hours as needed. Max Daily Amount: 400 mg. 4/2/16   Kia Miranda MD   simvastatin (ZOCOR) 40 mg tablet Take 20 mg by mouth nightly. Provider, Historical   ergocalciferol (ERGOCALCIFEROL) 50,000 unit capsule Take 50,000 Units by mouth every seven (7) days. Indications: PREVENTION OF VITAMIN D DEFICIENCY    Provider, Historical   promethazine (PHENERGAN) 25 mg tablet Take 25 mg by mouth every six (6) hours as needed for Nausea. Indications: nausea    Provider, Historical   albuterol (PROVENTIL HFA, VENTOLIN HFA, PROAIR HFA) 90 mcg/actuation inhaler Take  by inhalation every four (4) hours as needed for Wheezing or Shortness of Breath. Provider, Historical   co-enzyme Q-10 (CO Q-10) 100 mg capsule Take 100 mg by mouth daily. Provider, Historical   immune globulin 10% (GAMMAGARD) 10 % infusion 40 g by IntraVENous route two (2) times a week. Indications: [address]    Provider, Historical   guaiFENesin (MUCINEX) 1,200 mg Ta12 ER tablet Take 1,200 mg by mouth as needed for Congestion. Provider, Historical   diazepam (VALIUM) 2 mg tablet Take 2 mg by mouth every six (6) hours as needed for Sleep. Provider, Historical   vitamin c-vitamin e cap Take 1 Tab by mouth as needed. Provider, Historical   Biotin 2,500 mcg cap Take 1,000 mg by mouth daily. Provider, Historical   acetaminophen (TYLENOL) 500 mg tablet Take 1,000 mg by mouth every six (6) hours as needed for Pain.     Provider, Historical   vancomycin 1,000 mg 1,000 mg IVPB 1 g by IntraNASal route as needed (mixed with Amphotericin  for nasal wash). Provider, Historical   DULoxetine (CYMBALTA) 30 mg capsule Take 30 mg by mouth daily. Provider, Historical   traMADol (ULTRAM) 50 mg tablet Take 50 mg by mouth every six (6) hours as needed. Provider, Historical   zolpidem (AMBIEN) 10 mg tablet Take  by mouth nightly as needed. Provider, Historical   propranolol LA (INDERAL LA) 120 mg SR capsule Take 120 mg by mouth daily. Provider, Historical   multivitamin (ONE A DAY) tablet Take 1 Tab by mouth daily. Provider, Historical   celecoxib (CELEBREX) 200 mg capsule Take  by mouth two (2) times a day. Provider, Historical   gabapentin (NEURONTIN) 300 mg capsule Take 300 mg by mouth three (3) times daily. Provider, Historical   modafinil (PROVIGIL) 200 mg tablet Take 200 mg by mouth daily. Provider, Historical   aspirin 81 mg tablet Take 81 mg by mouth. Provider, Historical       Immunizations:  I have reviewed the patient's immunizations    There is no immunization history on file for this patient. Review of Systems:  A complete review of systems was performed as stated in the HPI, all others are negative.       Objective:    Physical Exam:  /84   Pulse 83   Ht 5' 2\" (1.575 m)   SpO2 98%   BMI 26.16 kg/m²   Vitals were personally reviewed  Gen: no acute distress, pleasant and cooperative, sitting up in chair, able to climb to exam table w/o difficulty  HEENT: normocephalic/atraumatic, PERRLA, EOMI, no scleral icteru, no oral lesions, good dentition, Mallampati III  Neck: supple, trachea midline, no JVD, no cervical and supraclavicular adenopathy  Chest: no lesions, with even rise and fall, no pectus excavatum or flail chest, port is in place of her right upper chest  CVS: regular rate rhythm, S1/S2, no murmurs/rubs/gallops  Lungs: good air entry B/L, CTABL, no wheezes/rales/rhonchi  Back: no kyphosis or scoliosis  Abdomen: soft, nontender, bowel sounds present, no hepatosplenomegaly  Ext: no pitting edema B/L, no peripheral cyanosis or clubbing  Neuro: grossly normal, AAOx3, normal strength and coordination grossly, no focal deficits  Skin: no rashes, erythema, lesions  Psych: normal memory, thought content, and processing    Labs: I have reviewed the patient's available labs --found in care [facility] from [facility] from 6/25/2019, hemoglobin 11.2, normal white count, normal platelets; BMP within normal limits    Lab Results   Component Value Date/Time    WBC 7.6 03/25/2016 12:54 PM    HGB 13.7 03/25/2016 12:54 PM    HCT 42.9 03/25/2016 12:54 PM    PLATELET 489 73/84/7791 12:54 PM    MCV 89.6 03/25/2016 12:54 PM     Lab Results   Component Value Date/Time    Sodium 136 03/25/2016 12:54 PM    Potassium 4.3 03/25/2016 12:54 PM    Chloride 102 03/25/2016 12:54 PM    CO2 29 03/25/2016 12:54 PM    Glucose 103 03/25/2016 12:54 PM    BUN 13 03/25/2016 12:54 PM    Creatinine 1.0 03/25/2016 12:54 PM    GFR est AA >60.0 03/25/2016 12:54 PM    GFR est non-AA 60 03/25/2016 12:54 PM    Calcium 9.0 03/25/2016 12:54 PM    Bilirubin, total 0.4 03/25/2016 12:54 PM    AST (SGOT) 48 (H) 03/25/2016 12:54 PM    Alk. phosphatase 151 (H) 03/25/2016 12:54 PM    Protein, total 8.3 (H) 03/25/2016 12:54 PM    Albumin 3.3 (L) 03/25/2016 12:54 PM    ALT (SGPT) 45 03/25/2016 12:54 PM       Imaging:  I have personally reviewed patient's imaging --no new studies in the interval    CT Results (most recent):  Results from Hospital Encounter encounter on 09/21/18   CT CHEST W CONT    Narrative CT chest with contrast    HISTORY: Shortness of breath, bronchiectasis. History of lupus    COMPARISON: None. TECHNIQUE: Helical scans through the chest was obtained from the thoracic inlet  to the diaphragm after uneventful nonionic IV contrast administration.     All CT scans at this facility performed using dose optimization techniques as  appreciated to a performed exam, to include automated exposure control,  adjustment of the mA and or KU according to patient size (including appropriate  matching for site specific examination), or use of iterative reconstruction  technique. CONTRAST: 100 cc Isovue 300. FINDINGS:     Lung Parenchyma: Clear. Thyroid: Very atrophic. Mediastinum: No adenopathy. Pleural Space And Chest Wall: No significant pleural pathology. Heart: The heart and the pericardium appear normal.    Vasculature: The aorta and the great vessels are unremarkable. Imaged Upper Abdomen: Status post cholecystectomy noted. Osseous Structures: Unremarkable for age. Impression IMPRESSION:     1. No significant pulmonary or chest finding. Thank you for your referral.        PFTs: May 2019 shows normal spirometry, without BD response, normal lung volumes, diffusion capacity is mildly reduced. TTE:  I have reviewed the patient's TTE results  From care [facility] from 11/15/2018  Component Name Value Ref Range   EF Echo 60     Result Impression   :   NORMAL LEFT VENTRICULAR CAVITY SIZE AND SYSTOLIC FUNCTION   VISUALLY ESTIMATED EJECTION FRACTION 21-50%   NORMAL DIASTOLIC FUNCTION   SCLEROTIC TRILEAFLET AORTIC VALVE   MILD AORTIC REGURGITATION.    RVSP 15-20 MMHG   NEGATIVE BUBBLE STUDY X2     NO PREVIOUS REPORT FOR COMPARISON     Clinical Indications: Stroke/TIA/Peripheral Embolic Event   ICD Codes: Technical Quality: Fair     MEASUREMENTS:   2D ECHO    LV Diastolic Diameter Base LX     3.9 SM                9.1-8.3   LV Systolic Diameter Base MJ      5.2 cm                2.3-4.0   Fractional Shortening BASE LX     0.2   IVS Diastolic Thickness           1.1 cm                0.6-1.1 cm   LVPW Diastolic PGLQRXZNG          3.03 cm               0.6-1.1 cm   IVS to PW Ratio                   1.1   LA Systolic Diameter LX           2.6 cm                2.1-3.7 cm   LA Ao Ratio                       0.84   Aortic Root Diameter              3.1 HM                3.0-9.2 cm   RA Systolic Pressure              5 mmHg   LA Systolic ATEURJJC              43.0 mmHg   LA Area 4C View                   10.3 cm²   LA Area 2C View                   14 cm²   LA Length 4C                      3.9 cm   LA Length 2C                      5 cm   LA Volume                         22 cm³   PV Peak Gradient                  3 mmHg     DOPPLER    AV Peak Velocity                  113 cm/s   AV Peak Gradient                  5.1 mmHg   LVOT Peak Velocity                92.3 cm/s   LVOT Peak Gradient                3.4 mmHg   Mitral E Point Velocity           65.6 cm/s   Mitral  A Point Velocity          81.4 cm/s   Mitral A Duration                 158 ms   Mitral E to A Ratio               0.81                  1.0 to 1.5   Mitral E to LV E' Septal Ratio    11.2   Mitral E to LV E' Lateral Ratio   11.8   MV Deceleration Time              267 ms                160-240 ms   Pulm Vein Atrial Reversal Veloci  30.6 cm/s             <35 cm/s   Pulm Vein Atrial Duration         153 ms   E Prime Velocity                  5.6 cm/s   PV Peak Velocity                  85.5 cm/s   PV Peak Gradient                  2.9 mmHg   RA Pressure (Entered Value)       3 mmHg   TR Peak Velocity                  1.7 m/s   TR Peak DCSGBMME                  23.1 mmHg   RV Systolic OYPUDWEM              39.9 mmHg       FINDINGS:     Left Ventricle: Normal left ventricular cavity size. Normal left ventricular wall thickness. Left Ventricle The left ventricular systolic function is normal. Visually estimated ejection fraction 55-60% Normal   Function: diastolic function. Septal bounce noted. False tendon noted. LVEF: 60 %       Left Atrium: The left atrium is normal in size with a left atrial index of 13 ml/m2. Right Ventricle: The right ventricle is normal in size. Normal right ventricular function. TAPSE is 1.9 cm and TAPSV   is 10.0 cm/s.  RVSP 15-20 mmHg   Right Atrium: The right atrium is normal in size. Normal inferior vena cava size and collapse. Mitral Valve: The mitral leaflets are normal. No regurgitation or stenosis. Aortic Valve: sclerotic trileaflet aortic valve. Mild aortic regurgitation. No evidence of stenosis. Tricuspid Valve: The tricuspid valve is structurally normal. Trace tricuspid regurgitation. Pulmonic Valve: The pulmonic valve is structurally normal. Trace pulmonic regurgitation. Aorta: Normal aortic root diameter. Pericardium: No pericardial effusion. Masses / Shunts: No masses, shunts or thrombi seen.  Negative Bubble Study X2. Aramis Castillo MD   (Electronically Signed)   Final Date: 15 November 2018 17:02   Other Result Information   This result has an attachment that is not available. Result Narrative        ECHOCARDIOGRAPHIC REPORT     Exam Date: 2018-11-15 08:02 Gender: F Referring Physician: Diogenes Davis   Name: Alicia Chneg :  Sonographer: Sumner Goldberg   CPI: [id number] BP: 107 / 59 Ht: 62 Wt: 148 BSA: 1.71     Type of Exam: ECHO PER STROKE PROTOCOL   Procedure: 2-D echocardiogram,Color flow analysis, Spectral Doppler analysis, Bubble study   ____________________________________________________________________________________________________   __   Other Result Information   Ian Fried - 11/15/2018  6:32 PM EST       ECHOCARDIOGRAPHIC REPORT     Exam Date: 2018-11-15 08:02 Gender:  F Referring Physician: Diogenes Davis   Name: Shanelroz Skylar :  Sonographer: Sumner Goldberg   CPI: [id number] BP: 107 / 59 Ht: 62 Wt: 148 BSA: 1.71     Type of Exam: ECHO PER STROKE PROTOCOL   Procedure: 2-D echocardiogram,Color flow analysis, Spectral Doppler analysis, Bubble study  ____________________________________________________________________________________________________   __   IMPRESSION:   NORMAL LEFT VENTRICULAR CAVITY SIZE AND SYSTOLIC FUNCTION   VISUALLY ESTIMATED EJECTION FRACTION 04-01%   NORMAL DIASTOLIC FUNCTION   SCLEROTIC TRILEAFLET AORTIC VALVE   MILD AORTIC REGURGITATION.    RVSP 15-20 MMHG   NEGATIVE BUBBLE STUDY X2     NO PREVIOUS REPORT FOR COMPARISON     Clinical Indications: Stroke/TIA/Peripheral Embolic Event   ICD Codes: Technical Quality: Fair     MEASUREMENTS:   2D ECHO    LV Diastolic Diameter Base LX     3.9 cm                8.2-6.2   LV Systolic Diameter Base LX      3.1 cm                2.3-4.0   Fractional Shortening BASE LX     0.2   IVS Diastolic Thickness           1.1 cm                0.6-1.1 cm   LVPW Diastolic Thickness          0.98 cm               0.6-1.1 cm   IVS to PW Ratio                   1.1   LA Systolic Diameter LX           2.6 cm                2.1-3.7 cm   LA Ao Ratio                       0.84   Aortic Root Diameter              3.1 cm                9.1-2.9 cm   RA Systolic Pressure              5 mmHg   LA Systolic Pressure              16.6 mmHg   LA Area 4C View                   10.3 cm²   LA Area 2C View                   14 cm²   LA Length 4C                      3.9 cm   LA Length 2C                      5 cm   LA Volume                         22 cm³   PV Peak Gradient                  3 mmHg     DOPPLER    AV Peak Velocity                  113 cm/s   AV Peak Gradient                  5.1 mmHg   LVOT Peak Velocity                92.3 cm/s   LVOT Peak Gradient                3.4 mmHg   Mitral E Point Velocity           65.6 cm/s   Mitral  A Point Velocity          81.4 cm/s   Mitral A Duration                 158 ms   Mitral E to A Ratio               0.81                  1.0 to 1.5   Mitral E to LV E' Septal Ratio    11.2   Mitral E to LV E' Lateral Ratio   11.8   MV Deceleration Time              267 ms                160-240 ms   Pulm Vein Atrial Reversal Veloci  30.6 cm/s             <35 cm/s   Pulm Vein Atrial Duration         153 ms   E Prime Velocity                  5.6 cm/s   PV Peak Velocity                  85.5 cm/s   PV Peak Gradient                  2.9 mmHg   RA Pressure (Entered Value)       3 mmHg   TR Peak Velocity                  1.7 m/s   TR Peak Gradient                  31.6 mmHg   RV Systolic Pressure              14.6 mmHg       FINDINGS:     Left Ventricle: Normal left ventricular cavity size. Normal left ventricular wall thickness. Left Ventricle The left ventricular systolic function is normal. Visually estimated ejection fraction 55-60% Normal   Function: diastolic function. Septal bounce noted. False tendon noted. LVEF: 60 %       Left Atrium: The left atrium is normal in size with a left atrial index of 13 ml/m2. Right Ventricle: The right ventricle is normal in size. Normal right ventricular function. TAPSE is 1.9 cm and TAPSV   is 10.0 cm/s. RVSP 15-20 mmHg   Right Atrium: The right atrium is normal in size. Normal inferior vena cava size and collapse. Mitral Valve: The mitral leaflets are normal. No regurgitation or stenosis. Aortic Valve: sclerotic trileaflet aortic valve. Mild aortic regurgitation. No evidence of stenosis. Tricuspid Valve: The tricuspid valve is structurally normal. Trace tricuspid regurgitation. Pulmonic Valve: The pulmonic valve is structurally normal. Trace pulmonic regurgitation. Aorta: Normal aortic root diameter. Pericardium: No pericardial effusion. Masses / Shunts: No masses, shunts or thrombi seen. Negative Bubble Study X2. Lonnie Mcduffie MD   (Electronically Signed)   Final Date: 15 November 2018 17:02         Assessment and Plan:  71 y.o. female with:    Impression:  1. Shortness of breath/dyspnea on exertion:  Etiology unclear but appears to be multifactorial.  CT scan does not show any evidence of ILD, and no evidence of bronchiectasis. Work-up from her previous providers show no evidence of VTE or pulmonary hypertension. Echo from November 2018 shows no evidence of cardiomyopathy. I suspect this may be driven by deconditioning from her chronic conditions. I also think there is a component of asthma/reactive airway disease given her improvement with as needed albuterol, however patient has not noted any improvement while on low-dose Breo. 2.  Chronic cough: Etiology unclear, I suspect this is may be worsened by LPR/GERD given that it occurs at nighttime. Unfortunately this is not resolved with PPI. Patient saw GI in prior visit, but declined pH/impedance testing. 3.  CVID with hypogammaglobulinemia:    Pt on IVIG supplementation  4. Hx of SLE:  Pt not on DMARD therapy, was on plaquenil prior  5. Mild to moderate persistent asthma suspected  6. Reduced diffusion capacity:  Moderate on last PFTs - unclear etiology, no significant desat with 6MWT  7. LPR/GERD:  Poorly controlled on BID PPI. Patient declined pH impedance testing  8. Reduced DLCO, mild    Plan:  -CPET testing ordered to delineate symptoms of shortness of breath. Risks and benefits of procedure were discussed with the patient in detail.  -Continue Breo 200/25 MCG 1 puff once daily. Counseled patient rinse mouth thoroughly after each use  -Continue Albuterol HFA 1-2puffs q4-6h PRN. Counseled patient that this is their rescue inhaler. Also counseled patient regarding premedication 15-30m prior to exercise or exposure to very cold air  -Counseled patient on proper inhaler technique  -Counseled patient to avoid potential triggers of asthma.  -Continue Singulair 10mg daily  -Counseled on reflux lifestyle precautions and to continue PPI  -Refer patient to GI for further workup and management of GERD -- pH Bravo testing  -Continue IVIG infusions q2 weeks by Dr. Tania Rodney  -Management of SLE per Dr. Tobin Fofana    Follow-up and Dispositions    · Return in about 6 months (around 4/10/2020).        Orders Placed This Encounter    RT--PULMONARY STRESS TEST COMPLEX       Radha Gallardo MD/MPH     Pulmonary, Critical Care Medicine  [facility]

## 2019-10-10 NOTE — LETTER
10/12/19 Patient: Macey Hutton YOB: 1955 Date of Visit: 10/10/2019 Joni Cook MD 
[address] [place] [address] VIA Facsimile: [phone] Dear Joni Cook MD, Thank you for referring Ms. Macey Hutton to [address] [place] [address] for evaluation. My notes for this consultation are attached. If you have questions, please do not hesitate to call me. I look forward to following your patient along with you. Sincerely, Marlene Thompson MD

## 2019-10-12 VITALS
HEIGHT: 62 IN | DIASTOLIC BLOOD PRESSURE: 84 MMHG | SYSTOLIC BLOOD PRESSURE: 110 MMHG | OXYGEN SATURATION: 98 % | HEART RATE: 83 BPM | BODY MASS INDEX: 26.16 KG/M2

## 2020-03-18 ENCOUNTER — TELEPHONE (OUTPATIENT)
Dept: PULMONOLOGY | Age: 65
End: 2020-03-18

## 2020-03-18 NOTE — TELEPHONE ENCOUNTER
Pt states she continues to have a cough that will not go away. Also, pt has a stress test scheduled at [facility] next week. Wants to know if due to the coronavirus it would be ok for her to cancel it until further notice. Currently has FU appt scheduled w/ Dr. Omega Sharif 4/30. Please advise .

## 2020-03-18 NOTE — TELEPHONE ENCOUNTER
Pt c/o cough only at hs that she has had for several months. No new onset. No fever and not felling bad. Pt to try Delsym at night. Regarding the stress test she is scheduled for Monday and not sure if she is going or not. She states Dr. Catie Mary advised her it was up to her if she had the test done.  She has appt 4/30 with Dr. Alena Martinez

## 2020-04-09 ENCOUNTER — TELEPHONE (OUTPATIENT)
Dept: PULMONOLOGY | Age: 65
End: 2020-04-09

## 2020-04-09 NOTE — TELEPHONE ENCOUNTER
Pt hasn't been able to get CPET done, is supposed to do virtual vs on 4/30. Do you want to wait or still do virtual call?

## 2020-10-06 DIAGNOSIS — D83.9 CVID (COMMON VARIABLE IMMUNODEFICIENCY) (HCC): ICD-10-CM

## 2020-10-06 DIAGNOSIS — R06.09 DYSPNEA ON EXERTION: ICD-10-CM

## 2020-10-06 DIAGNOSIS — K21.9 LARYNGOPHARYNGEAL REFLUX (LPR): ICD-10-CM

## 2020-10-06 DIAGNOSIS — R05.3 CHRONIC COUGH: ICD-10-CM

## 2020-10-06 DIAGNOSIS — M32.19 OTHER SYSTEMIC LUPUS ERYTHEMATOSUS WITH OTHER ORGAN INVOLVEMENT (HCC): ICD-10-CM

## 2020-10-06 DIAGNOSIS — R06.02 SHORTNESS OF BREATH: ICD-10-CM

## 2020-10-06 DIAGNOSIS — D80.1 HYPOGAMMAGLOBULINEMIA (HCC): ICD-10-CM

## 2020-10-06 DIAGNOSIS — J45.30 MILD PERSISTENT ASTHMA WITHOUT COMPLICATION: ICD-10-CM

## 2020-10-06 RX ORDER — FLUTICASONE FUROATE AND VILANTEROL TRIFENATATE 200; 25 UG/1; UG/1
1 POWDER RESPIRATORY (INHALATION) DAILY
Qty: 1 INHALER | Refills: 0 | Status: SHIPPED | OUTPATIENT
Start: 2020-10-06

## 2020-10-06 RX ORDER — ALBUTEROL SULFATE 90 UG/1
1 AEROSOL, METERED RESPIRATORY (INHALATION)
Qty: 1 INHALER | Refills: 0 | Status: SHIPPED | OUTPATIENT
Start: 2020-10-06

## 2020-10-06 NOTE — TELEPHONE ENCOUNTER
Pt requesting refill for Proventil and Breo. [facility] Please advise .

## 2020-10-12 ENCOUNTER — OFFICE VISIT (OUTPATIENT)
Dept: PULMONOLOGY | Age: 65
End: 2020-10-12
Payer: MEDICARE

## 2020-10-12 VITALS
HEART RATE: 69 BPM | WEIGHT: 151.8 LBS | HEIGHT: 62 IN | BODY MASS INDEX: 27.94 KG/M2 | OXYGEN SATURATION: 99 % | SYSTOLIC BLOOD PRESSURE: 138 MMHG | TEMPERATURE: 98.4 F | RESPIRATION RATE: 18 BRPM | DIASTOLIC BLOOD PRESSURE: 82 MMHG

## 2020-10-12 DIAGNOSIS — J40 EOSINOPHILIC BRONCHITIS: ICD-10-CM

## 2020-10-12 DIAGNOSIS — R06.09 DYSPNEA ON EXERTION: Primary | ICD-10-CM

## 2020-10-12 DIAGNOSIS — R05.3 CHRONIC COUGH: ICD-10-CM

## 2020-10-12 DIAGNOSIS — D72.18 EOSINOPHILIC BRONCHITIS: ICD-10-CM

## 2020-10-12 PROBLEM — E78.5 HYPERLIPIDEMIA, UNSPECIFIED: Status: ACTIVE | Noted: 2020-10-12

## 2020-10-12 PROBLEM — N39.0 URINARY TRACT INFECTION: Status: ACTIVE | Noted: 2020-10-12

## 2020-10-12 PROBLEM — R94.31 ABNORMAL ELECTROCARDIOGRAM: Status: ACTIVE | Noted: 2020-10-12

## 2020-10-12 PROBLEM — Z86.2 HISTORY OF ANEMIA: Status: ACTIVE | Noted: 2018-09-04

## 2020-10-12 PROBLEM — Z12.31 OTHER SCREENING MAMMOGRAM: Status: ACTIVE | Noted: 2020-10-12

## 2020-10-12 PROBLEM — M81.8 OTHER OSTEOPOROSIS WITHOUT CURRENT PATHOLOGICAL FRACTURE: Status: ACTIVE | Noted: 2017-06-21

## 2020-10-12 PROBLEM — R94.6 ABNORMAL RESULTS OF THYROID FUNCTION STUDIES: Status: ACTIVE | Noted: 2020-10-12

## 2020-10-12 PROBLEM — I44.7 LBBB (LEFT BUNDLE BRANCH BLOCK): Status: ACTIVE | Noted: 2018-11-15

## 2020-10-12 PROBLEM — Z01.818 PRE-OPERATIVE EXAMINATION: Status: ACTIVE | Noted: 2020-10-12

## 2020-10-12 PROBLEM — M54.50 LOW BACK PAIN: Status: ACTIVE | Noted: 2020-10-12

## 2020-10-12 PROBLEM — M54.17 RADICULOPATHY, LUMBOSACRAL REGION: Status: ACTIVE | Noted: 2020-10-12

## 2020-10-12 PROBLEM — M54.31 SCIATICA, RIGHT SIDE: Status: ACTIVE | Noted: 2020-10-12

## 2020-10-12 PROBLEM — R53.83 OTHER MALAISE AND FATIGUE: Status: ACTIVE | Noted: 2020-10-12

## 2020-10-12 PROBLEM — F33.41 MAJOR DEPRESSIVE DISORDER, RECURRENT, IN PARTIAL REMISSION (HCC): Status: ACTIVE | Noted: 2020-10-12

## 2020-10-12 PROBLEM — R74.02 NONSPECIFIC ELEVATION OF LEVELS OF TRANSAMINASE AND LACTIC ACID DEHYDROGENASE (LDH): Status: ACTIVE | Noted: 2020-10-12

## 2020-10-12 PROBLEM — R74.01 NONSPECIFIC ELEVATION OF LEVELS OF TRANSAMINASE AND LACTIC ACID DEHYDROGENASE (LDH): Status: ACTIVE | Noted: 2020-10-12

## 2020-10-12 PROBLEM — J04.0 ACUTE LARYNGITIS: Status: ACTIVE | Noted: 2020-10-12

## 2020-10-12 PROBLEM — L03.115 CELLULITIS OF RIGHT LEG: Status: ACTIVE | Noted: 2020-10-12

## 2020-10-12 PROBLEM — R06.02 SHORTNESS OF BREATH: Status: ACTIVE | Noted: 2020-10-12

## 2020-10-12 PROBLEM — G47.00 INSOMNIA: Status: ACTIVE | Noted: 2020-10-12

## 2020-10-12 PROBLEM — R60.9 EDEMA: Status: ACTIVE | Noted: 2020-10-12

## 2020-10-12 PROBLEM — D50.9 IRON DEFICIENCY ANEMIA: Status: ACTIVE | Noted: 2020-10-12

## 2020-10-12 PROBLEM — D63.8 ANEMIA OF OTHER CHRONIC DISEASE: Status: ACTIVE | Noted: 2020-10-12

## 2020-10-12 PROBLEM — M25.469 EFFUSION OF LOWER LEG JOINT: Status: ACTIVE | Noted: 2020-10-12

## 2020-10-12 PROBLEM — M79.18 MYALGIA, OTHER SITE: Status: ACTIVE | Noted: 2020-10-12

## 2020-10-12 PROBLEM — E04.2 NONTOXIC MULTINODULAR GOITER: Status: ACTIVE | Noted: 2020-10-12

## 2020-10-12 PROBLEM — S92.425A NONDISPLACED FRACTURE OF DISTAL PHALANX OF LEFT GREAT TOE, INITIAL ENCOUNTER FOR CLOSED FRACTURE: Status: ACTIVE | Noted: 2020-03-03

## 2020-10-12 PROBLEM — J06.9 ACUTE UPPER RESPIRATORY INFECTION: Status: ACTIVE | Noted: 2020-10-12

## 2020-10-12 PROBLEM — J02.9 ACUTE PHARYNGITIS: Status: ACTIVE | Noted: 2020-10-12

## 2020-10-12 PROBLEM — E06.3 HASHIMOTO'S THYROIDITIS: Status: ACTIVE | Noted: 2020-10-12

## 2020-10-12 PROBLEM — G43.109 MIGRAINE WITH AURA: Status: ACTIVE | Noted: 2020-10-12

## 2020-10-12 PROBLEM — M25.569 PAIN IN JOINT, LOWER LEG: Status: ACTIVE | Noted: 2020-10-12

## 2020-10-12 PROBLEM — F32.9 MAJOR DEPRESSIVE DISORDER, SINGLE EPISODE, UNSPECIFIED: Status: ACTIVE | Noted: 2020-10-12

## 2020-10-12 PROBLEM — M94.9 DISORDER OF BONE AND CARTILAGE: Status: ACTIVE | Noted: 2020-10-12

## 2020-10-12 PROBLEM — R53.81 OTHER MALAISE AND FATIGUE: Status: ACTIVE | Noted: 2020-10-12

## 2020-10-12 PROBLEM — M79.675 PAIN IN TOE OF LEFT FOOT: Status: ACTIVE | Noted: 2020-03-03

## 2020-10-12 PROBLEM — R05.9 COUGH: Status: ACTIVE | Noted: 2020-10-12

## 2020-10-12 PROBLEM — L60.0 INGROWN NAIL: Status: ACTIVE | Noted: 2020-10-12

## 2020-10-12 PROBLEM — D80.1 HYPOGAMMAGLOBULINEMIA (HCC): Status: ACTIVE | Noted: 2020-10-12

## 2020-10-12 PROBLEM — M79.671 PAIN IN RIGHT FOOT: Status: ACTIVE | Noted: 2020-10-12

## 2020-10-12 PROBLEM — E78.00 PURE HYPERCHOLESTEROLEMIA: Status: ACTIVE | Noted: 2020-10-12

## 2020-10-12 PROBLEM — I63.9 CEREBROVASCULAR ACCIDENT (HCC): Status: ACTIVE | Noted: 2020-10-12

## 2020-10-12 PROBLEM — E07.89 OTHER SPECIFIED DISORDERS OF THYROID: Status: ACTIVE | Noted: 2020-10-12

## 2020-10-12 PROBLEM — R73.01 IMPAIRED FASTING GLUCOSE: Status: ACTIVE | Noted: 2020-10-12

## 2020-10-12 PROBLEM — F32.89 OTHER DEPRESSIVE DISORDER: Status: ACTIVE | Noted: 2020-10-12

## 2020-10-12 PROBLEM — G35 MULTIPLE SCLEROSIS (HCC): Status: ACTIVE | Noted: 2017-11-21

## 2020-10-12 PROBLEM — M89.9 DISORDER OF BONE AND CARTILAGE: Status: ACTIVE | Noted: 2020-10-12

## 2020-10-12 PROBLEM — K21.9 GASTROESOPHAGEAL REFLUX DISEASE: Status: ACTIVE | Noted: 2018-09-04

## 2020-10-12 PROBLEM — E05.90 THYROTOXICOSIS: Status: ACTIVE | Noted: 2020-10-12

## 2020-10-12 PROBLEM — R63.5 WEIGHT GAIN: Status: ACTIVE | Noted: 2020-10-12

## 2020-10-12 PROBLEM — M32.9 SYSTEMIC LUPUS ERYTHEMATOSUS (HCC): Status: ACTIVE | Noted: 2018-09-04

## 2020-10-12 PROCEDURE — G9899 SCRN MAM PERF RSLTS DOC: HCPCS | Performed by: INTERNAL MEDICINE

## 2020-10-12 PROCEDURE — 99214 OFFICE O/P EST MOD 30 MIN: CPT | Performed by: INTERNAL MEDICINE

## 2020-10-12 PROCEDURE — G8427 DOCREV CUR MEDS BY ELIG CLIN: HCPCS | Performed by: INTERNAL MEDICINE

## 2020-10-12 PROCEDURE — 3288F FALL RISK ASSESSMENT DOCD: CPT | Performed by: INTERNAL MEDICINE

## 2020-10-12 PROCEDURE — G8536 NO DOC ELDER MAL SCRN: HCPCS | Performed by: INTERNAL MEDICINE

## 2020-10-12 PROCEDURE — 1100F PTFALLS ASSESS-DOCD GE2>/YR: CPT | Performed by: INTERNAL MEDICINE

## 2020-10-12 PROCEDURE — 1090F PRES/ABSN URINE INCON ASSESS: CPT | Performed by: INTERNAL MEDICINE

## 2020-10-12 PROCEDURE — G8510 SCR DEP NEG, NO PLAN REQD: HCPCS | Performed by: INTERNAL MEDICINE

## 2020-10-12 PROCEDURE — G8419 CALC BMI OUT NRM PARAM NOF/U: HCPCS | Performed by: INTERNAL MEDICINE

## 2020-10-12 PROCEDURE — 3017F COLORECTAL CA SCREEN DOC REV: CPT | Performed by: INTERNAL MEDICINE

## 2020-10-12 RX ORDER — BISACODYL 5 MG
TABLET, DELAYED RELEASE (ENTERIC COATED) ORAL
COMMUNITY
Start: 2020-09-22

## 2020-10-12 RX ORDER — THERA TABS 400 MCG
1 TAB ORAL DAILY
COMMUNITY

## 2020-10-12 RX ORDER — ATORVASTATIN CALCIUM 20 MG/1
1 TABLET, FILM COATED ORAL
COMMUNITY
Start: 2019-06-06

## 2020-10-12 RX ORDER — POLYETHYLENE GLYCOL-3350 AND ELECTROLYTES 236; 6.74; 5.86; 2.97; 22.74 G/274.31G; G/274.31G; G/274.31G; G/274.31G; G/274.31G
POWDER, FOR SOLUTION ORAL
COMMUNITY
Start: 2020-09-22

## 2020-10-12 NOTE — PROGRESS NOTES
Kameron Leos presents today for   Chief Complaint   Patient presents with    Asthma     follow up from 10/10/2019    Breathing Problem     TAYLOR, SOB    Pressure Behind the Eyes    Cough    Sleep Problem     snoring, excessive daytime sleepiness       Is someone accompanying this pt? Yes. Spouse    Is the patient using any DME equipment during OV? Yes. Boot on left foot    -DME Company N/A    Depression Screening:  3 most recent PHQ Screens 10/12/2020   Little interest or pleasure in doing things Not at all   Feeling down, depressed, irritable, or hopeless Not at all   Total Score PHQ 2 0       Learning Assessment:  Learning Assessment 5/3/2019   PRIMARY LEARNER Patient   PRIMARY LANGUAGE ENGLISH   LEARNER PREFERENCE PRIMARY DEMONSTRATION     LISTENING     PICTURES     READING     VIDEOS   ANSWERED BY Patient   RELATIONSHIP SELF       Abuse Screening:  Abuse Screening Questionnaire 10/12/2020   Do you ever feel afraid of your partner? N   Are you in a relationship with someone who physically or mentally threatens you? N   Is it safe for you to go home? Y       Fall Risk  Fall Risk Assessment, last 12 mths 10/12/2020   Able to walk? -   Fall in past 12 months? Yes   Fall with injury? Yes   Number of falls in past 12 months 8 or more   Fall Risk Score 9         Coordination of Care:  1. Have you been to the ER, urgent care clinic since your last visit? Hospitalized since your last visit? No    2. Have you seen or consulted any other health care providers outside of the [address] [place] [address] Ron[facility] since your last visit? Include any pap smears or colon screening. Yes.  Dr. Uri Pisano, PCP

## 2020-10-12 NOTE — PROGRESS NOTES
[address] [facility], [facility] [place] [address] [facility]  Pulmonary, Critical Care, and Sleep Medicine    Pulmonary Office F/U  Name: Fred Acevedo 76 y.o. female  MRN: [id number]  : 1955  Service Date: 10/12/20  Chief Complaint:   Chief Complaint   Patient presents with    Asthma     follow up from 10/10/2019    Breathing Problem     TAYLOR, SOB    Pressure Behind the Eyes    Cough    Sleep Problem     snoring, excessive daytime sleepiness       History of Present Illness:  Fred Acevedo is a 76 y.o. female, who presents to Pulmonary clinic for followup of bronchiectasis and shortness of breath. Patient was last seen on 10/10/2019. In the interval, patient reports her symptoms of shortness of breath persist.  She was unable to get her cardiopulmonary exercise test done due to COVID-19 pandemic. Patient continues report sinus issues, for which she follows with ENT, doing antibiotic nasal lavages. Patient continues to get IVIG infusions. Patient reports no changes to her shortness of breath, notes using Breo daily. Patient reports using PRN albuterol, a few times in the last month, slightly worsened due to change in weather. No exacerbations. Patient reports dyspnea with exertion mainly, still occurs with moderate exertion, alleviated with rest and PRN albuterol. Patient's chronic cough has reduced some, nonproductive. No other modifying factors  Pt denies N/V/D, chest pain/angina, night sweats, hemoptysis, sputum, abdominal pain, LE swelling.       Past Medical History:   Diagnosis Date    Anxiety     Aortic insufficiency     Chronic lung disease     CVA (cerebral vascular accident) ([address]     3 strokes and multiple transient ischemic attacks    Depression     anxiety    DOS (diffuse esophageal spasm)     Esophageal reflux     Fibromyalgia     Ganglion     Heart disease     Heart murmur     Hx MRSA infection     Hypercholesterolemia     Hypogammaglobulinemia (HCC)     IBS (irritable bowel syndrome)     MS (multiple sclerosis) (HCC)     Nausea and vomiting     SLE (systemic lupus erythematosus) (HCC)     Staphylococcus infection of nose 2009    Thyroid disease      Past Surgical History:   Procedure Laterality Date    HX  SECTION      HX CHOLECYSTECTOMY      HX HYSTERECTOMY      HX RHINOPLASTY      HX TONSILLECTOMY      HX TUBAL LIGATION      HX VASCULAR ACCESS Right 2011     Family History   Problem Relation Age of Onset    Heart Disease Mother     Diabetes Mother     Diabetes Father     Hypertension Father     Diabetes Maternal Aunt     Heart Disease Maternal Grandmother     Diabetes Maternal Grandmother     Stroke Maternal Grandmother     Diabetes Maternal Grandfather     Heart Disease Maternal Grandfather     Stroke Maternal Grandfather      Social History     Socioeconomic History    Marital status:      Spouse name: Not on file    Number of children: Not on file    Years of education: Not on file    Highest education level: Not on file   Occupational History    Not on file   Social Needs    Financial resource strain: Not on file    Food insecurity     Worry: Not on file     Inability: Not on file    Transportation needs     Medical: Not on file     Non-medical: Not on file   Tobacco Use    Smoking status: Never Smoker    Smokeless tobacco: Never Used   Substance and Sexual Activity    Alcohol use: No    Drug use: No    Sexual activity: Not on file   Lifestyle    Physical activity     Days per week: Not on file     Minutes per session: Not on file    Stress: Not on file   Relationships    Social connections     Talks on phone: Not on file     Gets together: Not on file     Attends Anabaptism service: Not on file     Active member of club or organization: Not on file     Attends meetings of clubs or organizations: Not on file     Relationship status: Not on file    Intimate partner violence     Fear of current or ex partner: Not on file     Emotionally abused: Not on file     Physically abused: Not on file     Forced sexual activity: Not on file   Other Topics Concern    Not on file   Social History Narrative    Not on file       Allergies: I have reviewed the allergy hx  Allergies   Allergen Reactions    Ceftin [Cefuroxime Axetil] Anaphylaxis and Angioedema    Codeine Hives    Dilaudid [Hydromorphone] Anaphylaxis and Angioedema    Hydrocodone Hives    Meperidine Hives    Oxycodone Hives    Sulfa (Sulfonamide Antibiotics) Hives    Adhesive Other (comments)    Cephalosporins Rash and Itching    Linezolid Nausea and Vomiting       Medications:  I have reviewed the patient's medications  Prior to Admission medications    Medication Sig Start Date End Date Taking? Authorizing Provider   fluticasone furoate-vilanteroL (Breo Ellipta) 200-25 mcg/dose inhaler Take 1 Puff by inhalation daily. 10/6/20   Gita Masterson MD   albuterol (PROVENTIL HFA, VENTOLIN HFA, PROAIR HFA) 90 mcg/actuation inhaler Take 1 Puff by inhalation every four (4) hours as needed for Wheezing or Shortness of Breath. 10/6/20   Gita Masterson MD   doxylamine succinate (UNISOM, DOXYLAMINE,) 25 mg tablet Take 25 mg by mouth nightly as needed. Provider, Historical   nitroglycerin (NITROSTAT) 0.4 mg SL tablet nitroglycerin 0.4 mg sublingual tablet    Provider, Historical   rOPINIRole (REQUIP) 0.25 mg tablet Take 0.25 mg by mouth daily as needed. 5/4/11   Provider, Historical   rosuvastatin (CRESTOR) 5 mg tablet Take 5 mg by mouth daily. 5/3/11   Provider, Historical   triamcinolone acetonide (KENALOG) 0.1 % dental paste triamcinolone acetonide 0.1 % dental paste 8/14/19   Provider, Historical   gentamicin sulfate (GENTAMICIN, BULK,) 590 mcg/mg powd 1 Dose by Both Nostrils route two (2) times a day. 7/16/19   Provider, Historical   fluticasone propionate (XHANCE) 93 mcg/actuation aerb 1 Dose by Nasal route daily. Provider, Historical   cranberry extract 425 mg cap Take 425 mg by mouth daily. Provider, Historical   fluconazole (DIFLUCAN) 150 mg tablet Take 150 mg by mouth daily. Provider, Historical   levothyroxine (SYNTHROID) 50 mcg tablet take 1 tablet by oral route one day and 0.5 tab the next and continue in alternating fashion 12/21/18   Provider, Historical   montelukast (SINGULAIR) 10 mg tablet Take 1 Tab by mouth daily. Provider, Historical   rizatriptan (MAXALT) 10 mg tablet Take 10 mg by mouth once as needed. 4/17/19   Provider, Historical   meloxicam (MOBIC) 7.5 mg tablet Take 7.5 mg by mouth daily. Provider, Historical   cyclobenzaprine (FLEXERIL) 10 mg tablet Take 10 mg by mouth two (2) times a day. Provider, Historical   sertraline (ZOLOFT) 100 mg tablet Take 200 mg by mouth daily. Provider, Historical   clopidogrel (PLAVIX) 75 mg tab Take 75 mg by mouth daily. Provider, Historical   metFORMIN (GLUMETZA ER) 500 mg TG24 24 hour tablet Take 1 Tab by mouth two (2) times a day. Provider, Historical   lansoprazole (PREVACID) 30 mg capsule Take 30 mg by mouth Daily (before breakfast). Provider, Historical   diazepam (VALIUM) 2 mg tablet Take 1 Tab by mouth every six (6) hours as needed for Sleep. Max Daily Amount: 8 mg. 4/2/16   Sarbjit Vásquez MD   traMADol (ULTRAM) 50 mg tablet Take 2 Tabs by mouth every six (6) hours as needed. Max Daily Amount: 400 mg. 4/2/16   Sarbjit Vásquez MD   ergocalciferol (ERGOCALCIFEROL) 50,000 unit capsule Take 50,000 Units by mouth every seven (7) days. Indications: PREVENTION OF VITAMIN D DEFICIENCY    Provider, Historical   promethazine (PHENERGAN) 25 mg tablet Take 25 mg by mouth every six (6) hours as needed for Nausea. Indications: nausea    Provider, Historical   co-enzyme Q-10 (CO Q-10) 100 mg capsule Take 100 mg by mouth daily.     Provider, Historical   immune globulin 10% (GAMMAGARD) 10 % infusion 40 g by IntraVENous route two (2) times a week. Indications: [address]    Provider, Historical   guaiFENesin (MUCINEX) 1,200 mg Ta12 ER tablet Take 1,200 mg by mouth as needed for Congestion. Provider, Historical   Biotin 2,500 mcg cap Take 1,000 mg by mouth daily. Provider, Historical   acetaminophen (TYLENOL) 500 mg tablet Take 1,000 mg by mouth every six (6) hours as needed for Pain. Provider, Historical   propranolol LA (INDERAL LA) 120 mg SR capsule Take 120 mg by mouth daily. Provider, Historical   multivitamin (ONE A DAY) tablet Take 1 Tab by mouth daily. Provider, Historical   gabapentin (NEURONTIN) 300 mg capsule Take 300 mg by mouth three (3) times daily. Provider, Historical   modafinil (PROVIGIL) 200 mg tablet Take 200 mg by mouth daily. Provider, Historical       Immunizations:  I have reviewed the patient's immunizations  Immunization History   Administered Date(s) Administered    Influenza High Dose Vaccine PF 10/06/2019, 12/05/2019    Influenza Vaccine 09/22/2015, 01/19/2017, 10/01/2018    Influenza Vaccine (>6 mo Afluria QUAD Vial 93399 (0.25 mL) / 73080 (0.5 mL)) 11/07/2013    Influenza, Quadrivalent, Adjuvanted (>65 Yrs FLUAD QUAD [id number]) 09/20/2020    Pneumococcal Polysaccharide (PPSV-23) 05/09/2013    Td, Adsorbed 01/14/2008       Review of Systems:  A complete review of systems was performed as stated in the HPI, all others are negative.       Objective:    Physical Exam:  /82 (BP 1 Location: Right arm, BP Patient Position: Sitting)   Pulse 69   Temp 98.4 °F (36.9 °C) (Oral)   Resp 18   Ht 5' 2\" (1.575 m)   Wt 68.9 kg (151 lb 12.8 oz)   SpO2 99%   BMI 27.76 kg/m²   Vitals were personally reviewed  Gen: no acute distress, pleasant and cooperative, sitting up in chair, able to climb to exam table w/o difficulty  HEENT: normocephalic/atraumatic, PERRLA, EOMI, no scleral icteru, no oral lesions, good dentition, Mallampati III  Neck: supple, trachea midline, no JVD, no cervical and supraclavicular adenopathy  Chest: no lesions, with even rise and fall, no pectus excavatum or flail chest, port is in place of her right upper chest  CVS: regular rate rhythm, S1/S2, no murmurs/rubs/gallops  Lungs: good air entry B/L, CTABL, no wheezes/rales/rhonchi  Back: no kyphosis or scoliosis  Abdomen: soft, nontender, bowel sounds present, no hepatosplenomegaly  Ext: no pitting edema B/L, no peripheral cyanosis or clubbing  Neuro: grossly normal, AAOx3, normal strength and coordination grossly, no focal deficits  Skin: no rashes, erythema, lesions  Psych: normal memory, thought content, and processing    Labs: I have reviewed the patient's available labs --found in care everywhere from [facility], from 9/22/2020, complement studies negative, anti-double-stranded DNA negative, creatinine 1.09    Lab Results   Component Value Date/Time    WBC 7.6 03/25/2016 12:54 PM    HGB 13.7 03/25/2016 12:54 PM    HCT 42.9 03/25/2016 12:54 PM    PLATELET 849 40/91/0436 12:54 PM    MCV 89.6 03/25/2016 12:54 PM     Lab Results   Component Value Date/Time    Sodium 136 03/25/2016 12:54 PM    Potassium 4.3 03/25/2016 12:54 PM    Chloride 102 03/25/2016 12:54 PM    CO2 29 03/25/2016 12:54 PM    Glucose 103 03/25/2016 12:54 PM    BUN 13 03/25/2016 12:54 PM    Creatinine 1.0 03/25/2016 12:54 PM    GFR est AA >60.0 03/25/2016 12:54 PM    GFR est non-AA 60 03/25/2016 12:54 PM    Calcium 9.0 03/25/2016 12:54 PM    Bilirubin, total 0.4 03/25/2016 12:54 PM    Alk. phosphatase 151 (H) 03/25/2016 12:54 PM    Protein, total 8.3 (H) 03/25/2016 12:54 PM    Albumin 3.3 (L) 03/25/2016 12:54 PM    ALT (SGPT) 45 03/25/2016 12:54 PM       Imaging:  I have personally reviewed patient's imaging --no new studies in the interval    CT Results (most recent):  Results from Hospital Encounter encounter on 09/21/18   CT CHEST W CONT    Narrative CT chest with contrast    HISTORY: Shortness of breath, bronchiectasis.  History of lupus    COMPARISON: None. TECHNIQUE: Helical scans through the chest was obtained from the thoracic inlet  to the diaphragm after uneventful nonionic IV contrast administration. All CT scans at this facility performed using dose optimization techniques as  appreciated to a performed exam, to include automated exposure control,  adjustment of the mA and or KU according to patient size (including appropriate  matching for site specific examination), or use of iterative reconstruction  technique. CONTRAST: 100 cc Isovue 300. FINDINGS:     Lung Parenchyma: Clear. Thyroid: Very atrophic. Mediastinum: No adenopathy. Pleural Space And Chest Wall: No significant pleural pathology. Heart: The heart and the pericardium appear normal.    Vasculature: The aorta and the great vessels are unremarkable. Imaged Upper Abdomen: Status post cholecystectomy noted. Osseous Structures: Unremarkable for age. Impression IMPRESSION:     1. No significant pulmonary or chest finding. Thank you for your referral.        PFTs: May 2019 shows normal spirometry, without BD response, normal lung volumes, diffusion capacity is mildly reduced. TTE:  I have reviewed the patient's TTE results  From care [facility] at [facility] from 11/15/2018  Component Name Value Ref Range   EF Echo 60     Result Impression   :   NORMAL LEFT VENTRICULAR CAVITY SIZE AND SYSTOLIC FUNCTION   VISUALLY ESTIMATED EJECTION FRACTION 38-48%   NORMAL DIASTOLIC FUNCTION   SCLEROTIC TRILEAFLET AORTIC VALVE   MILD AORTIC REGURGITATION.    RVSP 15-20 MMHG   NEGATIVE BUBBLE STUDY X2     NO PREVIOUS REPORT FOR COMPARISON     Clinical Indications: Stroke/TIA/Peripheral Embolic Event   ICD Codes: Technical Quality: Fair     MEASUREMENTS:   2D ECHO    LV Diastolic Diameter Base LX     3.9 YT                3.2-2.8   LV Systolic Diameter Base KELSEA      2.4 cm                2.3-4.0   Fractional Shortening BASE LX     0.2 IVS Diastolic Thickness           1.1 cm                0.6-1.1 cm   LVPW Diastolic CMMORNOUT          8.87 cm               0.6-1.1 cm   IVS to PW Ratio                   1.1   LA Systolic Diameter LX           2.6 cm                2.1-3.7 cm   LA Ao Ratio                       0.84   Aortic Root Diameter              3.1 ZO                6.1-8.2 cm   RA Systolic Pressure              5 mmHg   LA Systolic JWLJLYKY              13.3 mmHg   LA Area 4C View                   10.3 cm²   LA Area 2C View                   14 cm²   LA Length 4C                      3.9 cm   LA Length 2C                      5 cm   LA Volume                         22 cm³   PV Peak Gradient                  3 mmHg     DOPPLER    AV Peak Velocity                  113 cm/s   AV Peak Gradient                  5.1 mmHg   LVOT Peak Velocity                92.3 cm/s   LVOT Peak Gradient                3.4 mmHg   Mitral E Point Velocity           65.6 cm/s   Mitral  A Point Velocity          81.4 cm/s   Mitral A Duration                 158 ms   Mitral E to A Ratio               0.81                  1.0 to 1.5   Mitral E to LV E' Septal Ratio    11.2   Mitral E to LV E' Lateral Ratio   11.8   MV Deceleration Time              267 ms                160-240 ms   Pulm Vein Atrial Reversal Veloci  30.6 cm/s             <35 cm/s   Pulm Vein Atrial Duration         153 ms   E Prime Velocity                  5.6 cm/s   PV Peak Velocity                  85.5 cm/s   PV Peak Gradient                  2.9 mmHg   RA Pressure (Entered Value)       3 mmHg   TR Peak Velocity                  1.7 m/s   TR Peak QEHFFJIT                  47.5 mmHg   RV Systolic KGSYMUAC              45.3 mmHg       FINDINGS:     Left Ventricle: Normal left ventricular cavity size. Normal left ventricular wall thickness. Left Ventricle The left ventricular systolic function is normal. Visually estimated ejection fraction 55-60% Normal   Function: diastolic function.  Septal bounce noted. False tendon noted. LVEF: 60 %       Left Atrium: The left atrium is normal in size with a left atrial index of 13 ml/m2. Right Ventricle: The right ventricle is normal in size. Normal right ventricular function. TAPSE is 1.9 cm and TAPSV   is 10.0 cm/s. RVSP 15-20 mmHg   Right Atrium: The right atrium is normal in size. Normal inferior vena cava size and collapse. Mitral Valve: The mitral leaflets are normal. No regurgitation or stenosis. Aortic Valve: sclerotic trileaflet aortic valve. Mild aortic regurgitation. No evidence of stenosis. Tricuspid Valve: The tricuspid valve is structurally normal. Trace tricuspid regurgitation. Pulmonic Valve: The pulmonic valve is structurally normal. Trace pulmonic regurgitation. Aorta: Normal aortic root diameter. Pericardium: No pericardial effusion. Masses / Shunts: No masses, shunts or thrombi seen.  Negative Bubble Study X2. Dustin Ferguson MD   (Electronically Signed)   Final Date: 15 November 2018 17:02   Other Result Information   This result has an attachment that is not available. Result Narrative        ECHOCARDIOGRAPHIC REPORT     Exam Date: 2018-11-15 08:02 Gender: F Referring Physician: Ashleigh Burciaga   Name: Deborah Zamorano :  Sonographer: Amina Vasquez   CPI: [id number] BP: 107 / 59 Ht: 62 Wt: 148 BSA: 1.71     Type of Exam: ECHO PER STROKE PROTOCOL   Procedure: 2-D echocardiogram,Color flow analysis, Spectral Doppler analysis, Bubble study   ____________________________________________________________________________________________________   __   Other Result Information   Jordon Simon - 11/15/2018  6:32 PM EST       ECHOCARDIOGRAPHIC REPORT     Exam Date: 2018-11-15 08:02 Gender:  F Referring Physician: Ashleigh Burciaga   Name: Deborah Zamorano :  Sonographer: Amina Vasquez   CPI: [id number] BP: 107 / 59 Ht: 62 Wt: 148 BSA: 1.71     Type of Exam: ECHO PER STROKE PROTOCOL   Procedure: 2-D echocardiogram,Color flow analysis, Spectral Doppler analysis, Bubble study  ____________________________________________________________________________________________________   __   IMPRESSION:   NORMAL LEFT VENTRICULAR CAVITY SIZE AND SYSTOLIC FUNCTION   VISUALLY ESTIMATED EJECTION FRACTION 61-97%   NORMAL DIASTOLIC FUNCTION   SCLEROTIC TRILEAFLET AORTIC VALVE   MILD AORTIC REGURGITATION.    RVSP 15-20 MMHG   NEGATIVE BUBBLE STUDY X2     NO PREVIOUS REPORT FOR COMPARISON     Clinical Indications: Stroke/TIA/Peripheral Embolic Event   ICD Codes: Technical Quality: Fair     MEASUREMENTS:   2D ECHO    LV Diastolic Diameter Base LX     3.9 cm                0.4-8.3   LV Systolic Diameter Base LX      3.1 cm                2.3-4.0   Fractional Shortening BASE LX     0.2   IVS Diastolic Thickness           1.1 cm                0.6-1.1 cm   LVPW Diastolic Thickness          0.98 cm               0.6-1.1 cm   IVS to PW Ratio                   1.1   LA Systolic Diameter LX           2.6 cm                2.1-3.7 cm   LA Ao Ratio                       0.84   Aortic Root Diameter              3.1 cm                8.8-2.7 cm   RA Systolic Pressure              5 mmHg   LA Systolic Pressure              16.6 mmHg   LA Area 4C View                   10.3 cm²   LA Area 2C View                   14 cm²   LA Length 4C                      3.9 cm   LA Length 2C                      5 cm   LA Volume                         22 cm³   PV Peak Gradient                  3 mmHg     DOPPLER    AV Peak Velocity                  113 cm/s   AV Peak Gradient                  5.1 mmHg   LVOT Peak Velocity                92.3 cm/s   LVOT Peak Gradient                3.4 mmHg   Mitral E Point Velocity           65.6 cm/s   Mitral  A Point Velocity          81.4 cm/s   Mitral A Duration                 158 ms   Mitral E to A Ratio               0.81                  1.0 to 1.5   Mitral E to LV E' Septal Ratio    11.2   Mitral E to LV E' Lateral Ratio   11.8   MV Deceleration Time              267 ms                160-240 ms   Pulm Vein Atrial Reversal Veloci  30.6 cm/s             <35 cm/s   Pulm Vein Atrial Duration         153 ms   E Prime Velocity                  5.6 cm/s   PV Peak Velocity                  85.5 cm/s   PV Peak Gradient                  2.9 mmHg   RA Pressure (Entered Value)       3 mmHg   TR Peak Velocity                  1.7 m/s   TR Peak Gradient                  90.5 mmHg   RV Systolic Pressure              14.6 mmHg       FINDINGS:     Left Ventricle: Normal left ventricular cavity size. Normal left ventricular wall thickness. Left Ventricle The left ventricular systolic function is normal. Visually estimated ejection fraction 55-60% Normal   Function: diastolic function. Septal bounce noted. False tendon noted. LVEF: 60 %       Left Atrium: The left atrium is normal in size with a left atrial index of 13 ml/m2. Right Ventricle: The right ventricle is normal in size. Normal right ventricular function. TAPSE is 1.9 cm and TAPSV   is 10.0 cm/s. RVSP 15-20 mmHg   Right Atrium: The right atrium is normal in size. Normal inferior vena cava size and collapse. Mitral Valve: The mitral leaflets are normal. No regurgitation or stenosis. Aortic Valve: sclerotic trileaflet aortic valve. Mild aortic regurgitation. No evidence of stenosis. Tricuspid Valve: The tricuspid valve is structurally normal. Trace tricuspid regurgitation. Pulmonic Valve: The pulmonic valve is structurally normal. Trace pulmonic regurgitation. Aorta: Normal aortic root diameter. Pericardium: No pericardial effusion. Masses / Shunts: No masses, shunts or thrombi seen. Negative Bubble Study X2. Cynthia Hill MD   (Electronically Signed)   Final Date: 15 November 2018 17:02         Assessment and Plan:  76 y.o. female with:    Impression:  1.  Shortness of breath/dyspnea on exertion:  Etiology unclear but appears to be multifactorial.  CT scan does not show any evidence of ILD, and no evidence of bronchiectasis. Work-up from her previous providers show no evidence of VTE or pulmonary hypertension. Echo from November 2018 shows no evidence of cardiomyopathy. I suspect this may be driven by deconditioning from her chronic conditions. I also think there is a component of asthma/reactive airway disease given her improvement with as needed albuterol, but notes ongoing dyspnea despite being on breo. 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  2.  Chronic cough: Etiology unclear, I suspect this is may be worsened by LPR/GERD given that it occurs at nighttime. Unfortunately this is not resolved with PPI. Patient saw GI in prior visit, but declined pH/impedance testing. 3.  CVID with hypogammaglobulinemia:    Pt on IVIG supplementation  4. Hx of SLE:  Pt not on DMARD therapy, was on plaquenil prior  5. Mild to moderate persistent asthma suspected  6. Reduced diffusion capacity:  Moderate on last PFTs - unclear etiology, no significant desat with 6MWT  7. LPR/GERD:  Poorly controlled on BID PPI. Patient declined pH impedance testing  8. Reduced DLCO, mild    Plan:  -Would still recommend CPET testing ordered to delineate symptoms of shortness of breath. Unfortunately, will have to wait until COVID-19 pandemic has subsided due to testing being suspended  -Continue Breo 200/25 MCG 1 puff once daily.   Counseled patient rinse mouth thoroughly after each use  -Continue Albuterol HFA 1-2puffs q4-6h PRN  -Counseled patient on proper inhaler technique  -Counseled patient to avoid potential triggers of asthma.  -Continue Singulair 10mg daily  -Counseled on reflux lifestyle precautions and to continue PPI  -Management of GERD per GI  -Continue IVIG infusions q2 weeks by Dr. Samy Mallory  -Management of SLE per Dr. Aayush Khan       Follow-up and Dispositions    · Return in about 4 months (around 2/12/2021). No orders of the defined types were placed in this encounter.       Timmy Coleman MD/MPH     Pulmonary, Critical Care Medicine  [facility]

## 2020-10-12 NOTE — LETTER
10/18/20 Patient: Giuliana Whitney YOB: 1955 Date of Visit: 10/12/2020 Calista Gan MD 
[address] [place] [address] VIA Facsimile: [phone] Dear Calista Gan MD, Thank you for referring Ms. Giuliana Whitney to [address] [place] [address] for evaluation. My notes for this consultation are attached. If you have questions, please do not hesitate to call me. I look forward to following your patient along with you. Sincerely, Lisbeth Savage MD

## 2020-10-30 ENCOUNTER — TELEPHONE (OUTPATIENT)
Dept: PULMONOLOGY | Age: 65
End: 2020-10-30

## 2020-10-30 NOTE — TELEPHONE ENCOUNTER
Pt called([phone]). Wants to talk to nurse. She is having endoscopy and she states that Dr Ángel Mendez may have wanted her to have labs prior. Please call her back.

## 2020-10-30 NOTE — TELEPHONE ENCOUNTER
[facility] first states she is scheduled for a endoscopy by Dr. Lilly Aguayo and Dr. Timmy Lewis mentioned to have an enzyme test. Per note she had declined the pH impedance testing but now she is stating she didn't decline. After talking, she is scheduled for a colonoscopy 11/9 and wants Dr. Timmy Lewis to have and order go to Dr. Lilly Aguayo to add on the endoscopy.

## 2020-11-11 PROBLEM — Z12.31 OTHER SCREENING MAMMOGRAM: Status: RESOLVED | Noted: 2020-10-12 | Resolved: 2020-11-11

## 2020-11-11 PROBLEM — Z01.818 PRE-OPERATIVE EXAMINATION: Status: RESOLVED | Noted: 2020-10-12 | Resolved: 2020-11-11

## 2021-07-26 ENCOUNTER — TELEPHONE (OUTPATIENT)
Dept: PULMONOLOGY | Age: 66
End: 2021-07-26

## 2021-07-26 NOTE — TELEPHONE ENCOUNTER
Pt calling about a pulmonary stress test that she was supposed to have done over a year and a half ago. She stated that they only do them at [facility] and that they had stopped doing them due to covid. She would like to know if she can get a new order and if we can get this testing set up for her now.  Please advise [phone]

## 2021-07-26 NOTE — TELEPHONE ENCOUNTER
Left message on private answering machine for her to call back to make appt that was due 2/2021 and at that time she can discuss him re-ordering test that was ordered over 1 1/2 years ago

## 2021-08-03 PROBLEM — F32.89 OTHER DEPRESSIVE DISORDER: Status: RESOLVED | Noted: 2020-10-12 | Resolved: 2021-08-03

## 2021-08-19 ENCOUNTER — TELEPHONE (OUTPATIENT)
Dept: PULMONOLOGY | Age: 66
End: 2021-08-19

## 2021-08-19 NOTE — TELEPHONE ENCOUNTER
Pt states it has been ruled out her SOB is due to her heart. Wanting to schedule testing to see how her heart/lungs work together, soonest follow up w/ hp scheduled for 10/14. Pt states she needed to follow w/ hp prior to scheduling testing so wants to speak w/ nurse regarding her symptoms. Please advise .

## 2021-10-12 PROBLEM — Z95.0 STATUS POST PLACEMENT OF CARDIAC PACEMAKER: Status: ACTIVE | Noted: 2018-11-15

## 2021-10-12 PROBLEM — G43.009 MIGRAINE WITHOUT AURA AND WITHOUT STATUS MIGRAINOSUS, NOT INTRACTABLE: Status: ACTIVE | Noted: 2021-09-29

## 2021-10-12 PROBLEM — R42 VERTIGO: Status: ACTIVE | Noted: 2021-09-29

## 2021-10-12 PROBLEM — S22.050D COMPRESSION FRACTURE OF T6 VERTEBRA WITH ROUTINE HEALING: Status: ACTIVE | Noted: 2021-09-29

## 2021-10-12 PROBLEM — G25.81 RESTLESS LEG SYNDROME: Status: ACTIVE | Noted: 2021-09-29

## 2021-10-12 PROBLEM — K13.79 MOUTH SORES: Status: ACTIVE | Noted: 2021-09-29

## 2021-10-12 PROBLEM — J45.909 ASTHMA: Status: ACTIVE | Noted: 2020-11-03

## 2021-10-12 PROBLEM — G60.9 IDIOPATHIC PERIPHERAL NEUROPATHY: Status: ACTIVE | Noted: 2017-11-21
